# Patient Record
Sex: MALE | Race: WHITE | Employment: UNEMPLOYED | URBAN - METROPOLITAN AREA
[De-identification: names, ages, dates, MRNs, and addresses within clinical notes are randomized per-mention and may not be internally consistent; named-entity substitution may affect disease eponyms.]

---

## 2017-01-03 ENCOUNTER — OFFICE VISIT (OUTPATIENT)
Dept: FAMILY MEDICINE | Facility: CLINIC | Age: 42
End: 2017-01-03
Payer: COMMERCIAL

## 2017-01-03 VITALS
OXYGEN SATURATION: 97 % | WEIGHT: 216.6 LBS | DIASTOLIC BLOOD PRESSURE: 82 MMHG | BODY MASS INDEX: 31.08 KG/M2 | HEART RATE: 82 BPM | TEMPERATURE: 97.9 F | SYSTOLIC BLOOD PRESSURE: 122 MMHG

## 2017-01-03 DIAGNOSIS — G47.00 INSOMNIA, UNSPECIFIED TYPE: ICD-10-CM

## 2017-01-03 DIAGNOSIS — R07.0 THROAT PAIN: Primary | ICD-10-CM

## 2017-01-03 LAB
DEPRECATED S PYO AG THROAT QL EIA: NORMAL
MICRO REPORT STATUS: NORMAL
SPECIMEN SOURCE: NORMAL

## 2017-01-03 PROCEDURE — 87081 CULTURE SCREEN ONLY: CPT | Performed by: PHYSICIAN ASSISTANT

## 2017-01-03 PROCEDURE — 87880 STREP A ASSAY W/OPTIC: CPT | Performed by: PHYSICIAN ASSISTANT

## 2017-01-03 PROCEDURE — 99213 OFFICE O/P EST LOW 20 MIN: CPT | Performed by: PHYSICIAN ASSISTANT

## 2017-01-03 RX ORDER — TRAZODONE HYDROCHLORIDE 150 MG/1
150 TABLET ORAL AT BEDTIME
Qty: 30 TABLET | Refills: 0 | Status: SHIPPED | OUTPATIENT
Start: 2017-01-03 | End: 2017-03-24

## 2017-01-03 NOTE — NURSING NOTE
"Chief Complaint   Patient presents with     Pharyngitis       Initial /82 mmHg  Pulse 82  Temp(Src) 97.9  F (36.6  C) (Oral)  Wt 216 lb 9.6 oz (98.249 kg)  SpO2 97% Estimated body mass index is 31.08 kg/(m^2) as calculated from the following:    Height as of 2/18/16: 5' 10\" (1.778 m).    Weight as of this encounter: 216 lb 9.6 oz (98.249 kg).  BP completed using cuff size: cici VEGAS CMA ([facility])  3:26 PM 1/3/2017      "
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
## 2017-01-03 NOTE — Clinical Note
[facility]  [address] [facility] [address]  Phone: [phone]    January 3, 2017        Asher Gonzales  [address]  [facility] [address]          To whom it may concern:    RE: Asher Gonzales    Patient was seen and treated today at our clinic and missed work.    Please contact me for questions or concerns.      Sincerely,        Carole Rosenbaum PA-C

## 2017-01-05 LAB
BACTERIA SPEC CULT: NORMAL
MICRO REPORT STATUS: NORMAL
SPECIMEN SOURCE: NORMAL

## 2017-01-09 ENCOUNTER — OFFICE VISIT (OUTPATIENT)
Dept: FAMILY MEDICINE | Facility: CLINIC | Age: 42
End: 2017-01-09
Payer: COMMERCIAL

## 2017-01-09 VITALS
RESPIRATION RATE: 15 BRPM | HEART RATE: 97 BPM | TEMPERATURE: 97.7 F | DIASTOLIC BLOOD PRESSURE: 77 MMHG | WEIGHT: 213 LBS | SYSTOLIC BLOOD PRESSURE: 122 MMHG | BODY MASS INDEX: 30.56 KG/M2 | OXYGEN SATURATION: 95 %

## 2017-01-09 DIAGNOSIS — J20.9 ACUTE BRONCHITIS WITH SYMPTOMS > 10 DAYS: ICD-10-CM

## 2017-01-09 DIAGNOSIS — J01.90 ACUTE SINUSITIS WITH SYMPTOMS > 10 DAYS: Primary | ICD-10-CM

## 2017-01-09 DIAGNOSIS — R06.2 WHEEZING: ICD-10-CM

## 2017-01-09 PROCEDURE — 99213 OFFICE O/P EST LOW 20 MIN: CPT | Performed by: PHYSICIAN ASSISTANT

## 2017-01-09 RX ORDER — ALBUTEROL SULFATE 90 UG/1
2 AEROSOL, METERED RESPIRATORY (INHALATION) EVERY 4 HOURS PRN
Qty: 1 INHALER | Refills: 0 | Status: SHIPPED | OUTPATIENT
Start: 2017-01-09 | End: 2017-07-11

## 2017-01-09 RX ORDER — AZITHROMYCIN 250 MG/1
TABLET, FILM COATED ORAL
Qty: 6 TABLET | Refills: 0 | Status: SHIPPED | OUTPATIENT
Start: 2017-01-09 | End: 2017-03-24

## 2017-01-09 ASSESSMENT — PAIN SCALES - GENERAL: PAINLEVEL: EXTREME PAIN (8)
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
## 2017-01-09 NOTE — Clinical Note
[facility]  [address] [facility] [address]  Phone: [phone]    January 9, 2017        Asher Gonzales  [address]  [facility] [address]          To whom it may concern:    RE: Asher Gonzales    Patient was seen and treated today at our clinic. Please excuse from work 1/10/2017. May return 1/11/2017.    Please contact me for questions or concerns.      Sincerely,        Carole Rosenbaum PA-C

## 2017-01-09 NOTE — MR AVS SNAPSHOT
After Visit Summary   1/9/2017    Asher Gonzales    MRN: [id number]           Patient Information     Date Of Birth          1975        Visit Information        Provider Department      1/9/2017 3:00 PM Carole Rosenbaum PA-C [facility]        Today's Diagnoses     Acute sinusitis with symptoms > 10 days    -  1     Acute bronchitis with symptoms > 10 days         Wheezing            Follow-ups after your visit        Who to contact     If you have questions or need follow up information about today's clinic visit or your schedule please contact [facility] directly at [phone].  Normal or non-critical lab and imaging results will be communicated to you by Storitzhart, letter or phone within 4 business days after the clinic has received the results. If you do not hear from us within 7 days, please contact the clinic through [employer] Mobilet or phone. If you have a critical or abnormal lab result, we will notify you by phone as soon as possible.  Submit refill requests through Contextool or call your pharmacy and they will forward the refill request to us. Please allow 3 business days for your refill to be completed.          Additional Information About Your Visit        MyChart Information     [employer]ool gives you secure access to your electronic health record. If you see a primary care provider, you can also send messages to your care team and make appointments. If you have questions, please call your primary care clinic.  If you do not have a primary care provider, please call [phone] and they will assist you.        Care EveryWhere ID     This is your Care EveryWhere ID. This could be used by other organizations to access your [place] medical records  [id number]        Your Vitals Were     Pulse Temperature Respirations Pulse Oximetry          97 97.7  F (36.5  C) (Oral) 15 95%         Blood Pressure from Last 3 Encounters:   01/09/17 122/77   01/03/17 122/82   11/07/16  127/82    Weight from Last 3 Encounters:   01/09/17 213 lb (96.616 kg)   01/03/17 216 lb 9.6 oz (98.249 kg)   11/07/16 203 lb 9.6 oz (92.352 kg)              Today, you had the following     No orders found for display         Today's Medication Changes          These changes are accurate as of: 1/9/17  3:15 PM.  If you have any questions, ask your nurse or doctor.               Start taking these medicines.        Dose/Directions    albuterol 108 (90 BASE) MCG/ACT Inhaler   Commonly known as:  PROAIR HFA/PROVENTIL HFA/VENTOLIN HFA   Used for:  Wheezing   Started by:  Carole Rosenbaum PA-C        Dose:  2 puff   Inhale 2 puffs into the lungs every 4 hours as needed for shortness of breath / dyspnea or wheezing   Quantity:  1 Inhaler   Refills:  0       azithromycin 250 MG tablet   Commonly known as:  ZITHROMAX Z-CHAITANYA   Used for:  Acute sinusitis with symptoms > 10 days, Acute bronchitis with symptoms > 10 days   Started by:  Carole Rosenbaum PA-C        2 tabs day one then 1 tab qd   Quantity:  6 tablet   Refills:  0            Where to get your medicines      These medications were sent to [address]  [id number] [place] [address]     Phone:  [phone]    - albuterol 108 (90 BASE) MCG/ACT Inhaler  - azithromycin 250 MG tablet             Primary Care Provider Office Phone # Fax #    Angus Astorga [phone]       [address] [address]        Thank you!     Thank you for choosing [facility]  for your care. Our goal is always to provide you with excellent care. Hearing back from our patients is one way we can continue to improve our services. Please take a few minutes to complete the written survey that you may receive in the mail after your visit with us. Thank you!             Your Updated Medication List - Protect others around you: Learn how to safely use,  store and throw away your medicines at www.disposemymeds.org.          This list is accurate as of: 1/9/17  3:15 PM.  Always use your most recent med list.                   Brand Name Dispense Instructions for use    albuterol 108 (90 BASE) MCG/ACT Inhaler    PROAIR HFA/PROVENTIL HFA/VENTOLIN HFA    1 Inhaler    Inhale 2 puffs into the lungs every 4 hours as needed for shortness of breath / dyspnea or wheezing       azithromycin 250 MG tablet    ZITHROMAX Z-CHAITANYA    6 tablet    2 tabs day one then 1 tab qd       * mirtazapine 30 MG tablet    REMERON         * mirtazapine 30 MG tablet    REMERON    30 tablet    Take 1 tablet (30 mg) by mouth At Bedtime       traZODone 150 MG tablet    DESYREL    30 tablet    Take 1 tablet (150 mg) by mouth At Bedtime       * Notice:  This list has 2 medication(s) that are the same as other medications prescribed for you. Read the directions carefully, and ask your doctor or other care provider to review them with you.

## 2017-01-09 NOTE — NURSING NOTE
"Chief Complaint   Patient presents with     Cough     c/o cough and sore throat, see 1/3/17 and tested negative for strep       Initial /77 mmHg  Pulse 97  Temp(Src) 97.7  F (36.5  C) (Oral)  Resp 15  Wt 213 lb (96.616 kg)  SpO2 95% Estimated body mass index is 30.56 kg/(m^2) as calculated from the following:    Height as of 2/18/16: 5' 10\" (1.778 m).    Weight as of this encounter: 213 lb (96.616 kg).  BP completed using cuff size: christen Torres MA      "

## 2017-02-11 LAB — PHQ9 SCORE: 20

## 2017-03-09 ENCOUNTER — TRANSFERRED RECORDS (OUTPATIENT)
Dept: HEALTH INFORMATION MANAGEMENT | Facility: CLINIC | Age: 42
End: 2017-03-09

## 2017-03-20 ENCOUNTER — CARE COORDINATION (OUTPATIENT)
Dept: CARE COORDINATION | Facility: CLINIC | Age: 42
End: 2017-03-20

## 2017-03-20 ENCOUNTER — TELEPHONE (OUTPATIENT)
Dept: CARE COORDINATION | Facility: CLINIC | Age: 42
End: 2017-03-20

## 2017-03-20 DIAGNOSIS — I60.9 SUBARACHNOID HEMORRHAGE (H): Primary | ICD-10-CM

## 2017-03-20 NOTE — TELEPHONE ENCOUNTER
DC'd from [facility] on 3/18 to Home  Primary Problem: Subarachnoid hemorrhage  LOS: 1.7  Readmit Risk: High
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
## 2017-03-22 ENCOUNTER — CARE COORDINATION (OUTPATIENT)
Dept: CARE COORDINATION | Facility: CLINIC | Age: 42
End: 2017-03-22

## 2017-03-22 NOTE — PROGRESS NOTES
Clinic Care Coordination Contact  OUTREACH    Referral Information:  Referral Source: IP/TCU Report  Reason for Contact: Post Hospital Follow Up        Universal Utilization:   ED Visits in last year: 1  Hospital visits in last year: 1       Clinical Concerns:  Current Medical Concerns:   DC'd from [facility] on 3/18 to Home  Primary Problem: Subarachnoid hemorrhage  LOS: 1.7  Readmit Risk: High     Current Behavioral Concerns: Epic reviewed. [facility] visit note from 3/9/17. It indicated that patient had been drinking alcohol that day. Indicates that they will not accept him there and needs to enroll in CD program. Writer discussed case with Jad FERRER here at [facility]. She agreed to stop in to see him in clinic when here 3/24 for post hospital follow up visit. This was included in my voicemail to him today.    24/2017 11:00 AM Roseann Dickson PA-C [facility]          Outreach attempted x 1.  Left message on voicemail with call back information and requested return call.  Plan: Care Coordinator will touch base with Jad after see meets with patient. Will discuss plan going forward from there    Toby Bhatia RN  Clinic Care Coordinator  [facility]  [phone]

## 2017-03-24 ENCOUNTER — OFFICE VISIT (OUTPATIENT)
Dept: BEHAVIORAL HEALTH | Facility: CLINIC | Age: 42
End: 2017-03-24
Payer: COMMERCIAL

## 2017-03-24 ENCOUNTER — OFFICE VISIT (OUTPATIENT)
Dept: FAMILY MEDICINE | Facility: CLINIC | Age: 42
End: 2017-03-24

## 2017-03-24 ENCOUNTER — CARE COORDINATION (OUTPATIENT)
Dept: CARE COORDINATION | Facility: CLINIC | Age: 42
End: 2017-03-24

## 2017-03-24 VITALS
BODY MASS INDEX: 29.82 KG/M2 | TEMPERATURE: 99.1 F | HEART RATE: 107 BPM | HEIGHT: 71 IN | WEIGHT: 213 LBS | DIASTOLIC BLOOD PRESSURE: 84 MMHG | SYSTOLIC BLOOD PRESSURE: 118 MMHG | OXYGEN SATURATION: 97 %

## 2017-03-24 DIAGNOSIS — K13.0 LIP PAIN: ICD-10-CM

## 2017-03-24 DIAGNOSIS — Z86.69 H/O TONIC-CLONIC SEIZURES: ICD-10-CM

## 2017-03-24 DIAGNOSIS — F51.01 PRIMARY INSOMNIA: ICD-10-CM

## 2017-03-24 DIAGNOSIS — G47.00 INSOMNIA, UNSPECIFIED TYPE: ICD-10-CM

## 2017-03-24 DIAGNOSIS — F10.20 ALCOHOLISM (H): Primary | ICD-10-CM

## 2017-03-24 DIAGNOSIS — F33.9 EPISODE OF RECURRENT MAJOR DEPRESSIVE DISORDER, UNSPECIFIED DEPRESSION EPISODE SEVERITY (H): ICD-10-CM

## 2017-03-24 DIAGNOSIS — R69 DIAGNOSIS DEFERRED: Primary | ICD-10-CM

## 2017-03-24 PROCEDURE — 99207 ZZC NO CHARGE LOS: CPT

## 2017-03-24 RX ORDER — TRAZODONE HYDROCHLORIDE 150 MG/1
150 TABLET ORAL AT BEDTIME
Qty: 30 TABLET | Refills: 1 | Status: SHIPPED | OUTPATIENT
Start: 2017-03-24 | End: 2017-05-23

## 2017-03-24 RX ORDER — CARBAMAZEPINE 200 MG/1
200 TABLET ORAL
COMMUNITY
Start: 2017-03-18 | End: 2018-09-17

## 2017-03-24 RX ORDER — OXYCODONE AND ACETAMINOPHEN 5; 325 MG/1; MG/1
1 TABLET ORAL EVERY 8 HOURS PRN
Qty: 10 TABLET | Refills: 0 | Status: SHIPPED | OUTPATIENT
Start: 2017-03-24 | End: 2017-05-23

## 2017-03-24 RX ORDER — MIRTAZAPINE 30 MG/1
30 TABLET, FILM COATED ORAL AT BEDTIME
Qty: 30 TABLET | Refills: 1 | Status: SHIPPED | OUTPATIENT
Start: 2017-03-24 | End: 2017-05-23

## 2017-03-24 RX ORDER — OXYCODONE AND ACETAMINOPHEN 5; 325 MG/1; MG/1
1 TABLET ORAL
COMMUNITY
Start: 2017-03-18 | End: 2017-03-24

## 2017-03-24 ASSESSMENT — ANXIETY QUESTIONNAIRES
1. FEELING NERVOUS, ANXIOUS, OR ON EDGE: SEVERAL DAYS
4. TROUBLE RELAXING: MORE THAN HALF THE DAYS
7. FEELING AFRAID AS IF SOMETHING AWFUL MIGHT HAPPEN: NOT AT ALL
6. BECOMING EASILY ANNOYED OR IRRITABLE: MORE THAN HALF THE DAYS
5. BEING SO RESTLESS THAT IT IS HARD TO SIT STILL: NOT AT ALL
2. NOT BEING ABLE TO STOP OR CONTROL WORRYING: MORE THAN HALF THE DAYS
GAD7 TOTAL SCORE: 10
3. WORRYING TOO MUCH ABOUT DIFFERENT THINGS: NEARLY EVERY DAY

## 2017-03-24 NOTE — PROGRESS NOTES
Clinic Care Coordination Contact Attempt #2  OUTREACH     Referral Information:  Referral Source: IP/TCU Report  Reason for Contact: Post Hospital Follow Up         Universal Utilization:   ED Visits in last year: 1  Hospital visits in last year: 1         Clinical Concerns:  Current Medical Concerns:   DC'd from [facility] on 3/18 to Home  Primary Problem: Subarachnoid hemorrhage  LOS: 1.7  Readmit Risk: High       Epic reviewed. Had post hospital follow visit with Roseann Dickson PA-C today. Reviewed need to follow up with neurology for seizures-is in Tegretol 200 mg bid for 2 weeks and is to then take 400 mg bid on-going Patient spoke with Jad FERRER here at [facility]. No discussions regarding CD treatment. He did agree to sign up for [facility] services. Care Everywhere [facility] chart review:    1. Is to f/u with Dr [facility] in 2-3 weeks.  2. Dr Baugh? In 3-4 weeks  3.Referrals for PT-balance and OT-cognition were placed to Cynthia Argueta     24/2017 11:00 AM Roseann Dickson PA-C [facility]             Outreach attempted x 1. Left message on voicemail with call back information and requested return call.Sent [facility] letter    Plan: Care Coordinator will touch base with patient in 2-5 business days.     Toby Bhatia RN  Clinic Care Coordinator  [facility]  [phone]

## 2017-03-24 NOTE — PATIENT INSTRUCTIONS
Make sure to follow up with neurology for your seizures, this is important for long-term follow up of your seizures.    Never mix alcohol with percocet.  Because of your history of seizures it is best to completely avoid alcohol.    Wean off percocet, this can be addicting and therefore is used as short term as possible  Let me know if your tongue does not continue to improve

## 2017-03-24 NOTE — MR AVS SNAPSHOT
After Visit Summary   3/24/2017    Asher Gonzales    MRN: [id number]           Patient Information     Date Of Birth          1975        Visit Information        Provider Department      3/24/2017 11:30 AM Jad Nicholas [facility]        Today's Diagnoses     Diagnosis deferred    -  1       Follow-ups after your visit        Who to contact     If you have questions or need follow up information about today's clinic visit or your schedule please contact [facility] directly at [phone].  Normal or non-critical lab and imaging results will be communicated to you by MyChart, letter or phone within 4 business days after the clinic has received the results. If you do not hear from us within 7 days, please contact the clinic through [email or web address] or phone. If you have a critical or abnormal lab result, we will notify you by phone as soon as possible.  Submit refill requests through [employer] or call your pharmacy and they will forward the refill request to us. Please allow 3 business days for your refill to be completed.          Additional Information About Your Visit        MyChart Information     [employer] gives you secure access to your electronic health record. If you see a primary care provider, you can also send messages to your care team and make appointments. If you have questions, please call your primary care clinic.  If you do not have a primary care provider, please call [phone] and they will assist you.        Care EveryWhere ID     This is your Care EveryWhere ID. This could be used by other organizations to access your [place] medical records  [id number]         Blood Pressure from Last 3 Encounters:   03/24/17 118/84   01/09/17 122/77   01/03/17 122/82    Weight from Last 3 Encounters:   03/24/17 96.6 kg (213 lb)   01/09/17 96.6 kg (213 lb)   01/03/17 98.2 kg (216 lb 9.6 oz)              Today, you had the following     No orders found for display          Today's Medication Changes          These changes are accurate as of: 3/24/17 12:50 PM.  If you have any questions, ask your nurse or doctor.               These medicines have changed or have updated prescriptions.        Dose/Directions    oxyCODONE-acetaminophen 5-325 MG per tablet   Commonly known as:  PERCOCET   This may have changed:    - when to take this  - reasons to take this  - additional instructions   Used for:  Lip pain   Changed by:  Roseann Dickson PA-C        Dose:  1 tablet   Take 1 tablet by mouth every 8 hours as needed for moderate to severe pain No refills. Try to wean down on use.   Quantity:  10 tablet   Refills:  0            Where to get your medicines      These medications were sent to [facility] [address] [facility] [address]  [address] [address] [address]     Phone:  [phone]     mirtazapine 30 MG tablet    traZODone 150 MG tablet         Some of these will need a paper prescription and others can be bought over the counter.  Ask your nurse if you have questions.     Bring a paper prescription for each of these medications     oxyCODONE-acetaminophen 5-325 MG per tablet                Primary Care Provider Office Phone # Fax #    Angus Astorga -[phone]       [address] [address]        Thank you!     Thank you for choosing [facility]  for your care. Our goal is always to provide you with excellent care. Hearing back from our patients is one way we can continue to improve our services. Please take a few minutes to complete the written survey that you may receive in the mail after your visit with us. Thank you!             Your Updated Medication List - Protect others around you: Learn how to safely use, store and throw away your medicines at www.disposemymeds.org.          This list is accurate as of: 3/24/17 12:50 PM.  Always use your most recent  med list.                   Brand Name Dispense Instructions for use    albuterol 108 (90 BASE) MCG/ACT Inhaler    PROAIR HFA/PROVENTIL HFA/VENTOLIN HFA    1 Inhaler    Inhale 2 puffs into the lungs every 4 hours as needed for shortness of breath / dyspnea or wheezing       carBAMazepine 200 MG tablet    TEGretol     Take 200 mg by mouth       mirtazapine 30 MG tablet    REMERON    30 tablet    Take 1 tablet (30 mg) by mouth At Bedtime       oxyCODONE-acetaminophen 5-325 MG per tablet    PERCOCET    10 tablet    Take 1 tablet by mouth every 8 hours as needed for moderate to severe pain No refills. Try to wean down on use.       traZODone 150 MG tablet    DESYREL    30 tablet    Take 1 tablet (150 mg) by mouth At Bedtime

## 2017-03-24 NOTE — LETTER
[place] [facility] CARE COORDINATION  [address] [facility] [address]  Phone: [phone]      March 24, 2017      Asher Gonzales  [address]  [place] [address]    Dear Asher,    We have been trying to reach you to introduce you to [facility] s Care Coordination program.  The goal of care coordination is to help you manage your health and improve access to the [facility] system in the most efficient manner.  The Care Coordinator is a nurse who understands the healthcare system and will assist you in improving your access to care.     As your Physician and Care Coordinator we partner to help you achieve your health care goals.     We will continue to reach out; however, if you are able to call your Care Coordinator at [phone], that would be appreciated.  We at [facility] are focused on providing you with the highest-quality healthcare experience possible.      It is a pleasure to partner with you as we work towards achieving your optimal state of wellness.        Sincerely,        Angus Moscoso  None   [facility] [address] [address]  / [facility] 5*

## 2017-03-24 NOTE — MR AVS SNAPSHOT
After Visit Summary   3/24/2017    Asher Gonzales    MRN: [id number]           Patient Information     Date Of Birth          1975        Visit Information        Provider Department      3/24/2017 11:00 AM Roseann Dickson PA-C [facility]        Today's Diagnoses     Alcoholism (H)    -  1    H/O tonic-clonic seizures        Insomnia, unspecified type        Episode of recurrent major depressive disorder, unspecified depression episode severity (H)        Primary insomnia        Lip pain          Care Instructions    Make sure to follow up with neurology for your seizures, this is important for long-term follow up of your seizures.    Never mix alcohol with percocet.  Because of your history of seizures it is best to completely avoid alcohol.    Wean off percocet, this can be addicting and therefore is used as short term as possible  Let me know if your tongue does not continue to improve            Follow-ups after your visit        Who to contact     If you have questions or need follow up information about today's clinic visit or your schedule please contact [facility] directly at [phone].  Normal or non-critical lab and imaging results will be communicated to you by [email or web address], letter or phone within 4 business days after the clinic has received the results. If you do not hear from us within 7 days, please contact the clinic through [employer] or phone. If you have a critical or abnormal lab result, we will notify you by phone as soon as possible.  Submit refill requests through [employer] or call your pharmacy and they will forward the refill request to us. Please allow 3 business days for your refill to be completed.          Additional Information About Your Visit        [email or web address] Information     [employer] gives you secure access to your electronic health record. If you see a primary care provider, you can also send messages to your care team and make  "appointments. If you have questions, please call your primary care clinic.  If you do not have a primary care provider, please call [phone] and they will assist you.        Care EveryWhere ID     This is your Care EveryWhere ID. This could be used by other organizations to access your [place] medical records  [id number]        Your Vitals Were     Pulse Temperature Height Pulse Oximetry BMI (Body Mass Index)       107 99.1  F (37.3  C) (Oral) 5' 11\" (1.803 m) 97% 29.71 kg/m2        Blood Pressure from Last 3 Encounters:   03/24/17 118/84   01/09/17 122/77   01/03/17 122/82    Weight from Last 3 Encounters:   03/24/17 213 lb (96.6 kg)   01/09/17 213 lb (96.6 kg)   01/03/17 216 lb 9.6 oz (98.2 kg)              Today, you had the following     No orders found for display         Today's Medication Changes          These changes are accurate as of: 3/24/17 11:22 AM.  If you have any questions, ask your nurse or doctor.               These medicines have changed or have updated prescriptions.        Dose/Directions    oxyCODONE-acetaminophen 5-325 MG per tablet   Commonly known as:  PERCOCET   This may have changed:    - when to take this  - reasons to take this  - additional instructions   Used for:  Lip pain   Changed by:  Roseann Dickson PA-C        Dose:  1 tablet   Take 1 tablet by mouth every 8 hours as needed for moderate to severe pain No refills. Try to wean down on use.   Quantity:  10 tablet   Refills:  0            Where to get your medicines      These medications were sent to [facility] [address] [facility]  [address] [facility] [facility] [address], [facility] [address]     Phone:  [phone]     mirtazapine 30 MG tablet    traZODone 150 MG tablet         Some of these will need a paper prescription and others can be bought over the counter.  Ask your nurse if you have questions.     Bring a paper prescription for each of these medications     " oxyCODONE-acetaminophen 5-325 MG per tablet                Primary Care Provider Office Phone # Fax #    Angus Astorga [phone]       [facility] [address] [address] [facility] [address]        Thank you!     Thank you for choosing [facility]  for your care. Our goal is always to provide you with excellent care. Hearing back from our patients is one way we can continue to improve our services. Please take a few minutes to complete the written survey that you may receive in the mail after your visit with us. Thank you!             Your Updated Medication List - Protect others around you: Learn how to safely use, store and throw away your medicines at www.disposemymeds.org.          This list is accurate as of: 3/24/17 11:22 AM.  Always use your most recent med list.                   Brand Name Dispense Instructions for use    albuterol 108 (90 BASE) MCG/ACT Inhaler    PROAIR HFA/PROVENTIL HFA/VENTOLIN HFA    1 Inhaler    Inhale 2 puffs into the lungs every 4 hours as needed for shortness of breath / dyspnea or wheezing       carBAMazepine 200 MG tablet    TEGretol     Take 200 mg by mouth       mirtazapine 30 MG tablet    REMERON    30 tablet    Take 1 tablet (30 mg) by mouth At Bedtime       oxyCODONE-acetaminophen 5-325 MG per tablet    PERCOCET    10 tablet    Take 1 tablet by mouth every 8 hours as needed for moderate to severe pain No refills. Try to wean down on use.       traZODone 150 MG tablet    DESYREL    30 tablet    Take 1 tablet (150 mg) by mouth At Bedtime

## 2017-03-24 NOTE — NURSING NOTE
"Chief Complaint   Patient presents with     Hospital F/U     [facility] F/U for seizure       Initial /90  Pulse 107  Temp 99.1  F (37.3  C) (Oral)  Ht 5' 11\" (1.803 m)  Wt 213 lb (96.6 kg)  SpO2 97%  BMI 29.71 kg/m2 Estimated body mass index is 29.71 kg/(m^2) as calculated from the following:    Height as of this encounter: 5' 11\" (1.803 m).    Weight as of this encounter: 213 lb (96.6 kg).  Medication Reconciliation: complete      Dayne Oscar MA'    "

## 2017-03-24 NOTE — PROGRESS NOTES
Behavioral Health Home Services   [facility] Clinic: [place]    Social Work Care Navigator Note    Patient: Asher Gonzales  Date: March 24, 2017  Preferred Name: Asher    Previous PHQ-9:   PHQ-9 SCORE 1/27/2012 2/17/2012 10/9/2012   Total Score 9 8 0     Previous FRANKLIN-7: No flowsheet data found.  MAVERICK LEVEL:  MAVERICK Score (Last Two) 1/27/2012   MAVERICK Raw Score 37   Activation Score 49.9   MAVERICK Level 2       Preferred Contact:    Type of Contact: Face to Face in Clinic    Data: (subjective / Objective):    [facility] Introduction:  Hi my name is Jad Nicholas from your [place] primary care clinic.     I work closely with your primary care provider, Angus Astorga.     If it's ok I'd like to talk about some new services available to you, at no out of pocket cost to you.      Before we get started can you verify your insurance for me? [employer]    What social work or case management services do you receive? (If so, are you receiving ACT or TCM?) None     Getting to Know You - Whole Person Care:  This new service is called Behavioral Health Home services, which is designed to support you as a whole person beyond just your medical needs.      Tell me about what types of things that are causing you stress OR impacting your quality of life?    Unemployment    Health    Not being able to see his kids    I'm here to be a central point of contact for your healthcare needs and to help with:    Housing    Transportation    Financial resources    Comprehensive Health needs (appointment help, medication costs, etc.)    Employment    Education    Health Insurance applications    And connecting with social supports or community resources    Out of the things I mentioned what would you find helpful?    Writer met with pt to discuss [facility] service. Pt is interested in service and signed enrollment paperwork. Pt would like help with employment and having regular check in with [facility] in regards to overall health and his changes that occur in his life. Pt  "would like a call from RN Care Coordinator in regards to his health and hospital follow up. Pt signed MELISSA for therapist at [facility] [employer] in order to get DA.       Pt reports he gets SNAP benefits but is unable to get cash assistance due to living with his father and father having high income. Pt does utilize food shelf when necessary.       Writer will call pt when the DA is reviewed to set up comprehensive wellness assessment with [facility]. Writer welcomed phone calls from patient should he have any questions/concerns in the meantime.       We'll work together on a brief assessment to better understand how we can help and then put together a plan to meet your needs.    Patient response to [facility] Service offering:   Interested in enrolling in [facility] services and scheduled appt / will drop-in to complete the consent form and Brief Needs Assessment    Jad Nicholas, Social Work Care Coordinator   ________________________________________________________________  Administrative - Social Work Staff Info:     Update the [facility] Brief Needs Assessment Flowsheet \"enrollment status\"     \"declined\"    \"considering\" enrolling in [facility] services.      \"Interested and will enroll\"    \"waitlisted\"    Update enrollment status to \"enrolled\" only when they have come into clinic and completed the paper consent and brief needs assessment.    Verify that patient has had Diagnostic Assessment within the past 12 months and if not schedule an appointment with the [facility] to complete.              "

## 2017-03-25 ASSESSMENT — PATIENT HEALTH QUESTIONNAIRE - PHQ9: SUM OF ALL RESPONSES TO PHQ QUESTIONS 1-9: 13

## 2017-03-25 ASSESSMENT — ANXIETY QUESTIONNAIRES: GAD7 TOTAL SCORE: 10

## 2017-03-28 ENCOUNTER — CARE COORDINATION (OUTPATIENT)
Dept: CARE COORDINATION | Facility: CLINIC | Age: 42
End: 2017-03-28

## 2017-03-28 ENCOUNTER — TELEPHONE (OUTPATIENT)
Dept: BEHAVIORAL HEALTH | Facility: CLINIC | Age: 42
End: 2017-03-28

## 2017-03-28 NOTE — TELEPHONE ENCOUNTER
Behavioral Health Home Services  @FLOW([id number])@    Social Work Care Navigator Note    Patient: Asher Gonzales  Date: March 28, 2017  Preferred Name: Asher    Previous PHQ-9:   PHQ-9 SCORE 2/17/2012 10/9/2012 3/24/2017   Total Score 8 0 -   Total Score - - 13     Previous FRANKLIN-7:   FRANKLIN-7 SCORE 3/24/2017   Total Score 10     MAVERICK LEVEL:  MAVERICK Score (Last Two) 1/27/2012 3/24/2017   MAVERICK Raw Score 37 36   Activation Score 49.9 75.5   MAVERICK Level 2 4       Preferred Contact: @FLOW([id number])@  Type of Contact: Phone call (patient reached)    Data: (subjective / Objective):  Patient Goals Areas: @FLOW([id number])@  Patient Stated Goals: @FLOW([id number])@  Recent ED/IP Admission or Discharge?   [facility] ED Visit date: 03/16/2017    Objectives Addressed Today:    Patient called writer today to check on status on [facility]. Writer told pt that [facility] has to review DA first and that writer would call patient to schedule Wellness Assessment. Pt inquired about RN Care Coordinator follow up. Patient stated he would call RN Coordinator after conversation.       Writer to follow up with patient for wellness assessment.      Jad Nicholas, Social Work Care Coordinator

## 2017-03-28 NOTE — PROGRESS NOTES
Clinic Care Coordination Contact    OUTREACH      Referral Information:  Referral Source: IP/TCU Report  Reason for Contact: Post Hospital Follow Up. Warm transfer from Jad FERRER who is working with patient on chem dep issue and financial concerns.          Universal Utilization:   ED Visits in last year: 1  Hospital visits in last year: 1          Clinical Concerns:  Current Medical Concerns:   [facility] from [facility] Home  Primary Problem: Subarachnoid hemorrhage  LOS: 1.7  Readmit Risk: High        Had post hospital follow visit with Roseann Dickson PA-C . Reviewed need to follow up with neurology for seizures-is onTegretol 200 mg bid for 2 weeks and is to then take 400 mg bid on-going. Continues to have rib and sternal discomfort r/t resuscitation during hospitalization.      1. Is to f/u with Dr [facility] in 2-3 weeks. Discussed scheduling this week of 4/10. Made this a goal for him to schedule this. Per patient He will increase Tegretol to 400 mg bid 4/1  2. Dr Baugh-Neurosurgeon. It was decided during hospitalization that no surgical intervention needed. No follow up needed  3.Referrals for PT-balance and OT-cognition were placed to Cynthia Argueta. Patient does not want to pursue this. At present he wants to focus on neurology follow ups.      Plan: As above. Patient agreed to call me for any further needs. I will outreach him in 2 weeks.    Toby Bhatia RN  Clinic Care Coordinator  [facility]  [phone]

## 2017-03-29 ENCOUNTER — DOCUMENTATION ONLY (OUTPATIENT)
Dept: BEHAVIORAL HEALTH | Facility: CLINIC | Age: 42
End: 2017-03-29

## 2017-03-29 NOTE — PROGRESS NOTES
[facility] Chart Review and Problem List Reconciliation   Completed review of DA from [employer] dated 3/9/17 for [facility] Program enrollment   Documents scanned into Epic.

## 2017-04-05 ENCOUNTER — OFFICE VISIT (OUTPATIENT)
Dept: BEHAVIORAL HEALTH | Facility: CLINIC | Age: 42
End: 2017-04-05
Payer: COMMERCIAL

## 2017-04-05 DIAGNOSIS — R69 DIAGNOSIS DEFERRED: Primary | ICD-10-CM

## 2017-04-05 PROCEDURE — 99207 ZZC NO CHARGE LOS: CPT

## 2017-04-05 ASSESSMENT — ANXIETY QUESTIONNAIRES
4. TROUBLE RELAXING: SEVERAL DAYS
IF YOU CHECKED OFF ANY PROBLEMS ON THIS QUESTIONNAIRE, HOW DIFFICULT HAVE THESE PROBLEMS MADE IT FOR YOU TO DO YOUR WORK, TAKE CARE OF THINGS AT HOME, OR GET ALONG WITH OTHER PEOPLE: NOT DIFFICULT AT ALL
2. NOT BEING ABLE TO STOP OR CONTROL WORRYING: SEVERAL DAYS
7. FEELING AFRAID AS IF SOMETHING AWFUL MIGHT HAPPEN: NOT AT ALL
3. WORRYING TOO MUCH ABOUT DIFFERENT THINGS: SEVERAL DAYS
5. BEING SO RESTLESS THAT IT IS HARD TO SIT STILL: NOT AT ALL
6. BECOMING EASILY ANNOYED OR IRRITABLE: SEVERAL DAYS
1. FEELING NERVOUS, ANXIOUS, OR ON EDGE: SEVERAL DAYS
GAD7 TOTAL SCORE: 5

## 2017-04-05 NOTE — MR AVS SNAPSHOT
After Visit Summary   4/5/2017    Asher Gonzales    MRN: [id number]           Patient Information     Date Of Birth          1975        Visit Information        Provider Department      4/5/2017 11:00 AM Jad Nicholas [facility]        Today's Diagnoses     Diagnosis deferred    -  1       Follow-ups after your visit        Your next 10 appointments already scheduled     May 05, 2017 11:00 AM CDT   Return Visit with Jad Nicholas   [facility]    [address] [facility] [address] [phone]              Who to contact     If you have questions or need follow up information about today's clinic visit or your schedule please contact [facility] directly at [phone].  Normal or non-critical lab and imaging results will be communicated to you by MyChart, letter or phone within 4 business days after the clinic has received the results. If you do not hear from us within 7 days, please contact the clinic through MyChart or phone. If you have a critical or abnormal lab result, we will notify you by phone as soon as possible.  Submit refill requests through [employer] or call your pharmacy and they will forward the refill request to us. Please allow 3 business days for your refill to be completed.          Additional Information About Your Visit        MyChart Information     [employer] gives you secure access to your electronic health record. If you see a primary care provider, you can also send messages to your care team and make appointments. If you have questions, please call your primary care clinic.  If you do not have a primary care provider, please call [phone] and they will assist you.        Care EveryWhere ID     This is your Care EveryWhere ID. This could be used by other organizations to access your [place] medical records  [id number]         Blood Pressure from Last 3 Encounters:   03/24/17 118/84    01/09/17 122/77   01/03/17 122/82    Weight from Last 3 Encounters:   03/24/17 96.6 kg (213 lb)   01/09/17 96.6 kg (213 lb)   01/03/17 98.2 kg (216 lb 9.6 oz)              Today, you had the following     No orders found for display       Primary Care Provider Office Phone # Fax #    Angus Astorga [phone]       [facility] [address] [facility] [address]        Thank you!     Thank you for choosing [facility]  for your care. Our goal is always to provide you with excellent care. Hearing back from our patients is one way we can continue to improve our services. Please take a few minutes to complete the written survey that you may receive in the mail after your visit with us. Thank you!             Your Updated Medication List - Protect others around you: Learn how to safely use, store and throw away your medicines at www.disposemymeds.org.          This list is accurate as of: 4/5/17  3:55 PM.  Always use your most recent med list.                   Brand Name Dispense Instructions for use    albuterol 108 (90 BASE) MCG/ACT Inhaler    PROAIR HFA/PROVENTIL HFA/VENTOLIN HFA    1 Inhaler    Inhale 2 puffs into the lungs every 4 hours as needed for shortness of breath / dyspnea or wheezing       carBAMazepine 200 MG tablet    TEGretol     Take 200 mg by mouth       mirtazapine 30 MG tablet    REMERON    30 tablet    Take 1 tablet (30 mg) by mouth At Bedtime       oxyCODONE-acetaminophen 5-325 MG per tablet    PERCOCET    10 tablet    Take 1 tablet by mouth every 8 hours as needed for moderate to severe pain No refills. Try to wean down on use.       traZODone 150 MG tablet    DESYREL    30 tablet    Take 1 tablet (150 mg) by mouth At Bedtime

## 2017-04-05 NOTE — Clinical Note
Patient is enrolled in Behavioral Health Home services. Together we have created a care plan that patient intends to follow with the support of the team.     The initial goal areas we established include:  Health, Mental Health, Employment & Financial     PCP: I will notify you via your preferred communication method if there is something specific that patient needs in order for [facility] to be successful. Otherwise; feel free to gl ance at this care plan at your convience.     Please let me know if there are any changes OR additional goals that you would like to have added to Care Plan prior to patient receiving a copy.       Thank you!     Jad Nicholas, SUSANNE  [facility]

## 2017-04-05 NOTE — LETTER
Behavioral Health Home ([facility]): Health Action Plan  [facility]    Name: Asher Gonzales  Preferred Name: Asher  : 1975  MRN: [id number]    How to Contact me    Home Phone [phone]   Mobile [phone]     Ok to contact me by email?* No   *Signed & Scanned Consent form Required*   [email or web address]     Name and contact of hooker supports: (Yun (mother) [phone]) ; (Jeb (father) [phone]) ; (Joan (friend) No Phone # Provided)     My Goals  Goal Areas: Health, Mental Health, Employment / Volunteer, Financial and Social Service Benefits    Patient stated goals:     Health:Goal is to follow up with neurology and their medical recommendations.       Mental Health: Goal is to continue with therapy appointments, follow their recommendations and interventions provided. Pt also lists goal as wanting to get some medication for depression & anxiety.       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       Financial: Goal is to potentially receive some benefits from the state such as SNAP & Cash Assistance.     Strengths related to each goal: Pt is timely, comes to appointments, determined to take care of his health, honest and genuine.      Services and Supports Needed: Pt reports needing the support of the care team for check in and to make sure he is on the right track of achieving his goals. Pt needs support of care team to contact county and figure out his benefits including food stamps.     Activities / Actions of Team to support goal(s): Team will provide support & services/ tools in order for patient to achieve his stated goals. Team will check in with patient on regular basis through monthly contact and in person visits. Team will encourage patient and provide support through communication and team work with patient's care team in order for patient to work towards the accomplishment of his goals. Team  will contact [facility] Economic Assistance for pt to figue out his benefits.     Activities / Actions of Patient / Parent / Guardian to support goal(s):     Health: Pt will attend his follow up Neurology appt scheduled for 4/11/2017 and continue to follow any recommendations.       Mental Health: Pt will continue to attend this therapy appointments & follow through with any recommendations & interventions provided.       Employment: Pt will follow up on job resources provided by care team and complete applications / attend interviews etc.       Financial: Pt will follow up on any additional information provided by [facility] for his benefits of [employer] [place] or any other programs he is eligible for. Patient will follow up with care team at clinic for anything else he may need.         Recommended Referral  Tobacco cessation referrals made?: No  Mental Health / Chemical Dependency Referrals: No  Mental Health Referrals: None     My Team Members and Their Contact Information  Patient Care Team       Relationship Specialty Notifications Start End    Angus Astorga MD PCP - General Family Practice  2/22/12     Phone: [phone] Fax: [phone]         [facility] [address] [facility] [address]    Jad Nicholas   Admissions 4/5/17     Phone: [phone] Fax: [phone]         [address] [facility] [address]    Bettye Vazquez Psychologist Psychology  4/5/17     Phone: [phone] Fax: [phone]        [facility]  [address] [place] [address]    Smitha Lee MD MD Neurology  4/5/17     Phone: [phone] Fax: [phone]         [facility] [address] [facility] [address]          My Wellness Plan  Crisis Plan (emergencies / when urgent support needed): Pt reports no safety concerns at this time. Pt is aware to contact emergency services and/or family/friends if any safety concerns were to arise.        Asher Gonzales co-developed the Health Action Plan with the [facility] Team and received a copy of this document.  Date Health Action Plan Completed/Updated: 04/05/17

## 2017-04-05 NOTE — PROGRESS NOTES
Behavioral Health Home Services   [facility] Care Navigator [facility]    Patient: Asher Gonzales  Date: April 5, 2017  Preferred Name: Asher    Previous PHQ-9:   PHQ-9 SCORE 2/17/2012 10/9/2012 3/24/2017   Total Score 8 0 -   Total Score - - 13     Previous FRANKLIN-7:   FRANKLIN-7 SCORE 3/24/2017   Total Score 10     MAVERICK LEVEL:  MAVERICK Score (Last Two) 1/27/2012 3/24/2017   MAVERICK Raw Score 37 36   Activation Score 49.9 75.5   MAVERICK Level 2 4       Preferred Contact:    Type of Contact: Face to Face in Clinic    Data: (subjective / Objective):    Patient came in to complete the comprehensive wellness assessment for Behavioral Health Home Services.  See [facility] Flowsheets for details on the assessment.  See [facility], Behavioral Health Home for a copy of the patient's care plan.    Jad Nicholas, Social Work Care Coordinator

## 2017-04-06 ASSESSMENT — ANXIETY QUESTIONNAIRES: GAD7 TOTAL SCORE: 5

## 2017-04-06 ASSESSMENT — PATIENT HEALTH QUESTIONNAIRE - PHQ9: SUM OF ALL RESPONSES TO PHQ QUESTIONS 1-9: 5

## 2017-04-10 ENCOUNTER — TELEPHONE (OUTPATIENT)
Dept: BEHAVIORAL HEALTH | Facility: CLINIC | Age: 42
End: 2017-04-10

## 2017-04-10 NOTE — TELEPHONE ENCOUNTER
Behavioral Health Home Services  @FLOW([id number])@    Social Work Care Navigator Note    Patient: Asher Gonzales  Date: April 10, 2017  Preferred Name: Asher    Previous PHQ-9:   PHQ-9 SCORE 10/9/2012 3/24/2017 4/5/2017   Total Score 0 - -   Total Score - 13 5     Previous FRANKLIN-7:   FRANKLIN-7 SCORE 3/24/2017 4/5/2017   Total Score 10 5     MAVERICK LEVEL:  MAVERICK Score (Last Two) 3/24/2017 4/5/2017   MAVERICK Raw Score 36 30   Activation Score 75.5 56   MAVERICK Level 4 3       Preferred Contact: @FLOW([id number])@  Type of Contact: Phone call (patient reached)    Data: (subjective / Objective):  Patient Goals Areas: @FLOW([id number])@  Patient Stated Goals: @FLOW([id number])@  Recent ED/IP Admission or Discharge?   None    Objectives Addressed Today:    [facility] returned phone call back to writer re: patient and phone call on 4/5/17. [facility] stated that pt would be unable to receive medication management at any [facility] clinic due to his behavior at clinic and alcohol use. Pt was referred to CD treatment; pt refused and believes he does not have a problem with alcohol.       Writer will follow up with patient in regards to this and discuss CD treatment & other options for medication management. Writer will continue to be in touch with [facility] as needed.      Jad Nicholas, Social Work Care Coordinator               Next 5 appointments (look out 90 days)     May 05, 2017 11:00 AM CDT   [facility] with Jad Nicholas   [facility]    [address] [facility] [address] [phone]

## 2017-04-10 NOTE — TELEPHONE ENCOUNTER
Behavioral Health Home Services  @FLOW([id number])@    Social Work Care Navigator Note    Patient: Asher Gonzales  Date: April 10, 2017  Preferred Name: Asher    Previous PHQ-9:   PHQ-9 SCORE 10/9/2012 3/24/2017 4/5/2017   Total Score 0 - -   Total Score - 13 5     Previous FRANKLIN-7:   FRANKLIN-7 SCORE 3/24/2017 4/5/2017   Total Score 10 5     MAVERICK LEVEL:  MAVERICK Score (Last Two) 3/24/2017 4/5/2017   MAVERICK Raw Score 36 30   Activation Score 75.5 56   MAVERICK Level 4 3       Preferred Contact: @FLOW([id number])@  Type of Contact: Phone call (patient reached)    Data: (subjective / Objective):  Patient Goals Areas: @FLOW([id number])@  Patient Stated Goals: @FLOW([id number])@  Recent ED/IP Admission or Discharge?   None    Objectives Addressed Today:    Writer contacted [facility] on 4/5/17 to inquire about medication management as pt had stated that they won't manage pt's medication per scanned chart note on 3/9/2017. Therapist and provider were out of office this day and would return call to writer when able.       Jad Nicholas, Social Work Care Coordinator               Next 5 appointments (look out 90 days)     May 05, 2017 11:00 AM CDT   [facility]   [facility]    [address] [facility] [address] [phone]

## 2017-04-11 ENCOUNTER — TRANSFERRED RECORDS (OUTPATIENT)
Dept: HEALTH INFORMATION MANAGEMENT | Facility: CLINIC | Age: 42
End: 2017-04-11

## 2017-04-14 ENCOUNTER — CARE COORDINATION (OUTPATIENT)
Dept: CARE COORDINATION | Facility: CLINIC | Age: 42
End: 2017-04-14

## 2017-04-14 NOTE — PROGRESS NOTES
Clinic Care Coordination Contact  [facility]/Voicemail    Referral Source: [facility] ([facility])  Clinical Data: Care Coordinator Outreach  Clinical Concerns:  Current Medical Concerns:   DC'd from [facility] on 3/18 to Home  Primary Problem: Subarachnoid hemorrhage  LOS: 1.7  Readmit Risk: High       Had post hospital follow visit with Roseann Dickson PA-C . Reviewed need to follow up with neurology for seizures-is onTegretol 200 mg bid for 2 weeks and is to then take 400 mg bid on-going. Continues to have rib and sternal discomfort r/t resuscitation during hospitalization.       1. Is to f/u with Dr [facility] in 2-3 weeks. Discussed scheduling this week of 4/10. Made this a goal for him to schedule this. Per patient He will increase Tegretol to 400 mg bid 4/1  2. Dr Baugh-Neurosurgeon. It was decided during hospitalization that no surgical intervention needed. No follow up needed  3.Referrals for PT-balance and OT-cognition were placed to Cynthia Argueta. Patient does not want to pursue this. At present he wants to focus on neurology follow ups.      Outreach attempted x 1.  Left message on voicemail with call back information and requested return call.    Plan: As above. Patient agreed to call me for any further needs. I will outreach him in 1 week     Toby Bhatia RN  Clinic Care Coordinator  [facility]  [phone]

## 2017-05-05 ENCOUNTER — OFFICE VISIT (OUTPATIENT)
Dept: BEHAVIORAL HEALTH | Facility: CLINIC | Age: 42
End: 2017-05-05
Payer: COMMERCIAL

## 2017-05-05 DIAGNOSIS — R69 DIAGNOSIS DEFERRED: Primary | ICD-10-CM

## 2017-05-05 PROCEDURE — 99207 ZZC NO CHARGE LOS: CPT

## 2017-05-05 NOTE — MR AVS SNAPSHOT
After Visit Summary   5/5/2017    Asher Gonzales    MRN: [id number]           Patient Information     Date Of Birth          1975        Visit Information        Provider Department      5/5/2017 11:00 AM Jad Nicholas [facility]        Today's Diagnoses     Diagnosis deferred    -  1       Follow-ups after your visit        Your next 10 appointments already scheduled     Jul 07, 2017 11:00 AM CDT   Return Visit with Jad Nicholas   [facility] ([facility])    [address] [facility] [address] [phone]              Who to contact     If you have questions or need follow up information about today's clinic visit or your schedule please contact [facility] directly at [phone].  Normal or non-critical lab and imaging results will be communicated to you by MyChart, letter or phone within 4 business days after the clinic has received the results. If you do not hear from us within 7 days, please contact the clinic through MyChart or phone. If you have a critical or abnormal lab result, we will notify you by phone as soon as possible.  Submit refill requests through [employer] or call your pharmacy and they will forward the refill request to us. Please allow 3 business days for your refill to be completed.          Additional Information About Your Visit        MyChart Information     [employer] gives you secure access to your electronic health record. If you see a primary care provider, you can also send messages to your care team and make appointments. If you have questions, please call your primary care clinic.  If you do not have a primary care provider, please call [phone] and they will assist you.        Care EveryWhere ID     This is your Care EveryWhere ID. This could be used by other organizations to access your [facility] medical records  [id number]         Blood Pressure from Last 3 Encounters:   03/24/17 118/84    01/09/17 122/77   01/03/17 122/82    Weight from Last 3 Encounters:   03/24/17 96.6 kg (213 lb)   01/09/17 96.6 kg (213 lb)   01/03/17 98.2 kg (216 lb 9.6 oz)              Today, you had the following     No orders found for display       Primary Care Provider Office Phone # Fax #    Angus Astorga [phone]       [facility] [address] [facility] [address]        Thank you!     Thank you for choosing [facility]  for your care. Our goal is always to provide you with excellent care. Hearing back from our patients is one way we can continue to improve our services. Please take a few minutes to complete the written survey that you may receive in the mail after your visit with us. Thank you!             Your Updated Medication List - Protect others around you: Learn how to safely use, store and throw away your medicines at www.disposemymeds.org.          This list is accurate as of: 5/5/17 11:33 AM.  Always use your most recent med list.                   Brand Name Dispense Instructions for use    albuterol 108 (90 BASE) MCG/ACT Inhaler    PROAIR HFA/PROVENTIL HFA/VENTOLIN HFA    1 Inhaler    Inhale 2 puffs into the lungs every 4 hours as needed for shortness of breath / dyspnea or wheezing       carBAMazepine 200 MG tablet    TEGretol     Take 200 mg by mouth       mirtazapine 30 MG tablet    REMERON    30 tablet    Take 1 tablet (30 mg) by mouth At Bedtime       oxyCODONE-acetaminophen 5-325 MG per tablet    PERCOCET    10 tablet    Take 1 tablet by mouth every 8 hours as needed for moderate to severe pain No refills. Try to wean down on use.       traZODone 150 MG tablet    DESYREL    30 tablet    Take 1 tablet (150 mg) by mouth At Bedtime

## 2017-05-05 NOTE — PROGRESS NOTES
Behavioral Health Home Services  [facility]: [place]    Social Work Care Navigator Note    Patient: Asher Gonzales  Date: May 5, 2017  Preferred Name: Asher    Previous PHQ-9:   PHQ-9 SCORE 10/9/2012 3/24/2017 4/5/2017   Total Score 0 - -   Total Score - 13 5     Previous FRANKLIN-7:   FRANKLIN-7 SCORE 3/24/2017 4/5/2017   Total Score 10 5     MAVERICK LEVEL:  MAVERICK Score (Last Two) 3/24/2017 4/5/2017   MAVERICK Raw Score 36 30   Activation Score 75.5 56   MAVERICK Level 4 3       Preferred Contact: Preferred Contact: Cell  Type of Contact: Face to Face in Clinic    Data: (subjective / Objective):  Goal Areas: Health;Mental Health;Employment / Volunteer;Financial and Social Service     Patient Goals:    Health: Goal is to follow up with neurology and their medical recommendations.       Mental Health: Goal is to continue with therapy appointments, follow their recommendations and interventions provided. Pt also lists goal as wanting to get some medication for depression & anxiety.       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       Financial: Goal is to potentially receive some benefits from the state.     Recent ED/IP Admission or Discharge?   None    Objectives Addressed Today:    HAP/ Letter Given to Patient     New Stressors    CD & Medication for MH    Current Stressors / Issues:    Pt states nothing; he has been doing all right since last visit.     Mental Health: Pt states this has been fine.    Intervention:  Motivational Interviewing: Open-ended questions and Reflections: simple and complex     Assessment: (Progress on Goals / Homework):    Pt states he has started to date someone and that has helped tremendously with mental health    Pt states he takes Paxil (anxiety) & Tegretol ( Medication for seizures; started back at the hospital ER visit)- these meds are working well for him. He states he does not need to talk with [facility] about  these medications as his PCP and Neurologist are taking care of. States he does not have alcohol issue.     Called with patient [facility] to schedule appt with psychologist Bettye Vazquez : set up appt for May 15th @ 9am.     Pt states he switched to [facility]; writer will re-fax eligibility form to them for [facility].     Writer will contact [facility] to discuss patient's financial situation & figure out why he is unable to get cash assistance as he does not have any income coming in.     Provided patient with HAP Letter & Encouraged to call with any questions or concerns.     Plan: (Homework, other):  Patient was encouraged to continue to seek condition-related information and education.      Scheduled a Phone follow up appointment with NABIL ROMERO in 4 weeks     Patient has set self-identified goals and will monitor progress until the next appointment on: 06/20/2017.      Also set up clinic visit for July 7th @ 11am for [facility] Follow Up.      aJd Nicholas, Social Work Care Coordinator

## 2017-05-23 ENCOUNTER — OFFICE VISIT (OUTPATIENT)
Dept: FAMILY MEDICINE | Facility: CLINIC | Age: 42
End: 2017-05-23
Payer: COMMERCIAL

## 2017-05-23 ENCOUNTER — RADIANT APPOINTMENT (OUTPATIENT)
Dept: GENERAL RADIOLOGY | Facility: CLINIC | Age: 42
End: 2017-05-23
Attending: FAMILY MEDICINE
Payer: COMMERCIAL

## 2017-05-23 VITALS
DIASTOLIC BLOOD PRESSURE: 66 MMHG | HEART RATE: 94 BPM | BODY MASS INDEX: 28.03 KG/M2 | TEMPERATURE: 97.8 F | SYSTOLIC BLOOD PRESSURE: 103 MMHG | OXYGEN SATURATION: 96 % | WEIGHT: 201 LBS

## 2017-05-23 DIAGNOSIS — R07.81 RIB PAIN ON LEFT SIDE: Primary | ICD-10-CM

## 2017-05-23 DIAGNOSIS — R07.81 RIB PAIN ON LEFT SIDE: ICD-10-CM

## 2017-05-23 PROCEDURE — 99213 OFFICE O/P EST LOW 20 MIN: CPT | Performed by: FAMILY MEDICINE

## 2017-05-23 PROCEDURE — 71101 X-RAY EXAM UNILAT RIBS/CHEST: CPT | Mod: LT

## 2017-05-23 NOTE — MR AVS SNAPSHOT
After Visit Summary   5/23/2017    Asher Gonzales    MRN: [id number]           Patient Information     Date Of Birth          1975        Visit Information        Provider Department      5/23/2017 3:10 PM Angus Astorga MD [facility]        Today's Diagnoses     Rib pain on left side    -  1      Care Instructions    1. Your x-ray showed at least one fracture - this will be read by the radiologist.    2. Take a deep breath every 15 minutes.    3. Rib pain usually takes a while to heal (may be up to several weeks) - so try to be patient.    4. Apply heat or ice as needed for comfort.    5. Take Tylenol and Ibuprofen as needed.     6. Come back as needed.          Follow-ups after your visit        Your next 10 appointments already scheduled     Jul 07, 2017 11:00 AM CDT   [facility] Jad [facility]   [facility]    [address] [facility] [address] [phone]              Who to contact     If you have questions or need follow up information about today's clinic visit or your schedule please contact [facility] directly at [phone].  Normal or non-critical lab and imaging results will be communicated to you by [employer], letter or phone within 4 business days after the clinic has received the results. If you do not hear from us within 7 days, please contact the clinic through [employer] or phone. If you have a critical or abnormal lab result, we will notify you by phone as soon as possible.  Submit refill requests through [employer] or call your pharmacy and they will forward the refill request to us. Please allow 3 business days for your refill to be completed.          Additional Information About Your Visit        MyChart Information     [employer] gives you secure access to your electronic health record. If you see a primary care provider, you can also send messages to your care team and make appointments. If  you have questions, please call your primary care clinic.  If you do not have a primary care provider, please call [phone] and they will assist you.        Care EveryWhere ID     This is your Care EveryWhere ID. This could be used by other organizations to access your [facility] medical records  [id number]        Your Vitals Were     Pulse Temperature Pulse Oximetry BMI (Body Mass Index)          94 97.8  F (36.6  C) (Oral) 96% 28.03 kg/m2         Blood Pressure from Last 3 Encounters:   05/23/17 103/66   03/24/17 118/84   01/09/17 122/77    Weight from Last 3 Encounters:   05/23/17 201 lb (91.2 kg)   03/24/17 213 lb (96.6 kg)   01/09/17 213 lb (96.6 kg)                 Today's Medication Changes          These changes are accurate as of: 5/23/17  4:09 PM.  If you have any questions, ask your nurse or doctor.               Stop taking these medicines if you haven't already. Please contact your care team if you have questions.     mirtazapine 30 MG tablet   Commonly known as:  REMERON   Stopped by:  Angus Astorga MD           oxyCODONE-acetaminophen 5-325 MG per tablet   Commonly known as:  PERCOCET   Stopped by:  Angus Astorga MD           traZODone 150 MG tablet   Commonly known as:  DESYREL   Stopped by:  Angus Astorga MD                    Primary Care Provider Office Phone # Fax #    Angus Astorga [phone]       [facility] [address] [place] [address]        Thank you!     Thank you for choosing [facility]  for your care. Our goal is always to provide you with excellent care. Hearing back from our patients is one way we can continue to improve our services. Please take a few minutes to complete the written survey that you may receive in the mail after your visit with us. Thank you!             Your Updated Medication List - Protect others around you: Learn how to safely use, store and throw away your medicines at www.disposemymeds.org.           This list is accurate as of: 5/23/17  4:09 PM.  Always use your most recent med list.                   Brand Name Dispense Instructions for use    albuterol 108 (90 BASE) MCG/ACT Inhaler    PROAIR HFA/PROVENTIL HFA/VENTOLIN HFA    1 Inhaler    Inhale 2 puffs into the lungs every 4 hours as needed for shortness of breath / dyspnea or wheezing       carBAMazepine 200 MG tablet    TEGretol     Take 200 mg by mouth       PAXIL PO      Take by mouth daily

## 2017-05-23 NOTE — NURSING NOTE
"Chief Complaint   Patient presents with     Rib pain       Initial /66  Pulse 94  Temp 97.8  F (36.6  C) (Oral)  Wt 201 lb (91.2 kg)  SpO2 96%  BMI 28.03 kg/m2 Estimated body mass index is 28.03 kg/(m^2) as calculated from the following:    Height as of 3/24/17: 5' 11\" (1.803 m).    Weight as of this encounter: 201 lb (91.2 kg).  Medication Reconciliation: complete     Irais Christian cma      "

## 2017-05-23 NOTE — PATIENT INSTRUCTIONS
1. Your x-ray showed at least one fracture - this will be read by the radiologist.    2. Take a deep breath every 15 minutes.    3. Rib pain usually takes a while to heal (may be up to several weeks) - so try to be patient.    4. Apply heat or ice as needed for comfort.    5. Take Tylenol and Ibuprofen as needed.     6. Come back as needed.

## 2017-06-20 ENCOUNTER — OFFICE VISIT (OUTPATIENT)
Dept: BEHAVIORAL HEALTH | Facility: CLINIC | Age: 42
End: 2017-06-20
Payer: COMMERCIAL

## 2017-06-20 ENCOUNTER — OFFICE VISIT (OUTPATIENT)
Dept: FAMILY MEDICINE | Facility: CLINIC | Age: 42
End: 2017-06-20
Payer: COMMERCIAL

## 2017-06-20 ENCOUNTER — RADIANT APPOINTMENT (OUTPATIENT)
Dept: GENERAL RADIOLOGY | Facility: CLINIC | Age: 42
End: 2017-06-20
Attending: PHYSICIAN ASSISTANT
Payer: COMMERCIAL

## 2017-06-20 ENCOUNTER — TELEPHONE (OUTPATIENT)
Dept: BEHAVIORAL HEALTH | Facility: CLINIC | Age: 42
End: 2017-06-20

## 2017-06-20 VITALS
OXYGEN SATURATION: 97 % | SYSTOLIC BLOOD PRESSURE: 134 MMHG | TEMPERATURE: 98.2 F | WEIGHT: 206 LBS | DIASTOLIC BLOOD PRESSURE: 88 MMHG | HEART RATE: 102 BPM | BODY MASS INDEX: 28.73 KG/M2 | RESPIRATION RATE: 15 BRPM

## 2017-06-20 DIAGNOSIS — K60.2 ANAL FISSURE: ICD-10-CM

## 2017-06-20 DIAGNOSIS — R05.9 COUGH: ICD-10-CM

## 2017-06-20 DIAGNOSIS — R07.1 PAINFUL RESPIRATION: ICD-10-CM

## 2017-06-20 DIAGNOSIS — J20.9 ACUTE BRONCHITIS WITH SYMPTOMS > 10 DAYS: Primary | ICD-10-CM

## 2017-06-20 DIAGNOSIS — R69 DIAGNOSIS DEFERRED: Primary | ICD-10-CM

## 2017-06-20 PROCEDURE — 99207 ZZC NO CHARGE LOS: CPT

## 2017-06-20 PROCEDURE — 99214 OFFICE O/P EST MOD 30 MIN: CPT | Performed by: PHYSICIAN ASSISTANT

## 2017-06-20 PROCEDURE — 71020 XR CHEST 2 VW: CPT

## 2017-06-20 RX ORDER — AZITHROMYCIN 250 MG/1
TABLET, FILM COATED ORAL
Qty: 6 TABLET | Refills: 0 | Status: SHIPPED | OUTPATIENT
Start: 2017-06-20 | End: 2017-07-11

## 2017-06-20 RX ORDER — NAPROXEN 500 MG/1
500 TABLET ORAL 2 TIMES DAILY PRN
Qty: 30 TABLET | Refills: 1 | Status: SHIPPED | OUTPATIENT
Start: 2017-06-20 | End: 2017-07-11
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
## 2017-06-20 NOTE — TELEPHONE ENCOUNTER
Behavioral Health Home Services  [facility]: [place]    Social Work Care Navigator Note    Patient: Asher Gonzales  Date: June 20, 2017  Preferred Name: Asher    Previous PHQ-9:   PHQ-9 SCORE 10/9/2012 3/24/2017 4/5/2017   Total Score 0 - -   Total Score - 13 5     Previous FRANKLIN-7:   FRANKLIN-7 SCORE 3/24/2017 4/5/2017   Total Score 10 5     MAVERICK LEVEL:  MAVERICK Score (Last Two) 3/24/2017 4/5/2017   MAVERICK Raw Score 36 30   Activation Score 75.5 56   MAVERICK Level 4 3     Preferred Contact:  Need for : No  Preferred Contact: Cell    Type of Contact Today: Phone call (not reached/unavailable)    Data: (subjective / Objective):    Attempted to reach patient, but was unsuccessful. Writer left  for patient to return phone call when able to as patient and writer had scheduled phone [facility] outreach for today @ 10am. Writer also had information from [facility] re: Economic Assistance.  Plan to attempt again.      NABIL Day        Next 5 appointments (look out 90 days)     Jun 20, 2017 12:40 PM CDT   [facility] Carole [facility]    [address] [facility] [address]   [phone]            Jul 07, 2017 11:00 AM CDT   [facility] with Jad Nicholas   [facility]    [address] [facility] [address]   [phone]

## 2017-06-20 NOTE — MR AVS SNAPSHOT
After Visit Summary   6/20/2017    Asher Gonzales    MRN: [id number]           Patient Information     Date Of Birth          1975        Visit Information        Provider Department      6/20/2017 12:40 PM Carole Rosenbaum PA-C [facility]        Today's Diagnoses     Painful respiration    -  1    Cough        Acute bronchitis with symptoms > 10 days        Anal fissure           Follow-ups after your visit        Your next 10 appointments already scheduled     Jul 07, 2017 11:00 AM CDT   Return Visit with Jad Nicholas   [facility]    [address] [facility] [address] [phone]              Who to contact     If you have questions or need follow up information about today's clinic visit or your schedule please contact [facility] directly at [phone].  Normal or non-critical lab and imaging results will be communicated to you by MyChart, letter or phone within 4 business days after the clinic has received the results. If you do not hear from us within 7 days, please contact the clinic through MyChart or phone. If you have a critical or abnormal lab result, we will notify you by phone as soon as possible.  Submit refill requests through [facility] or call your pharmacy and they will forward the refill request to us. Please allow 3 business days for your refill to be completed.          Additional Information About Your Visit        MyChart Information     [facility] gives you secure access to your electronic health record. If you see a primary care provider, you can also send messages to your care team and make appointments. If you have questions, please call your primary care clinic.  If you do not have a primary care provider, please call [phone] and they will assist you.        Care EveryWhere ID     This is your Care EveryWhere ID. This could be used by other organizations to access your [place]  medical records  [id number]        Your Vitals Were     Pulse Temperature Respirations Pulse Oximetry BMI (Body Mass Index)       102 98.2  F (36.8  C) (Oral) 15 97% 28.73 kg/m2        Blood Pressure from Last 3 Encounters:   06/20/17 134/88   05/23/17 103/66   03/24/17 118/84    Weight from Last 3 Encounters:   06/20/17 206 lb (93.4 kg)   05/23/17 201 lb (91.2 kg)   03/24/17 213 lb (96.6 kg)                 Today's Medication Changes          These changes are accurate as of: 6/20/17  2:02 PM.  If you have any questions, ask your nurse or doctor.               Start taking these medicines.        Dose/Directions    azithromycin 250 MG tablet   Commonly known as:  ZITHROMAX Z-CHAITANYA   Used for:  Acute bronchitis with symptoms > 10 days   Started by:  Carole Rosenbaum PA-C        2 tabs day one then 1 tab qd   Quantity:  6 tablet   Refills:  0       hydrocortisone 2.5 % cream   Commonly known as:  ANUSOL-HC   Used for:  Anal fissure   Started by:  Carole Rosenbaum PA-C        Place rectally 2 times daily   Quantity:  30 g   Refills:  0       naproxen 500 MG tablet   Commonly known as:  NAPROSYN   Used for:  Painful respiration   Started by:  Carole Rosenbaum PA-C        Dose:  500 mg   Take 1 tablet (500 mg) by mouth 2 times daily as needed for moderate pain   Quantity:  30 tablet   Refills:  1            Where to get your medicines      These medications were sent to [facility] [address] [facility] [address]  [address] [place], [place] [address] [address]     Phone:  [phone]     azithromycin 250 MG tablet    hydrocortisone 2.5 % cream    naproxen 500 MG tablet                Primary Care Provider Office Phone # Fax #    Angus Astorga [phone]       [address] [address]        Thank you!     Thank you for choosing [facility]  for your care. Our goal is always to provide you with excellent care.  Hearing back from our patients is one way we can continue to improve our services. Please take a few minutes to complete the written survey that you may receive in the mail after your visit with us. Thank you!             Your Updated Medication List - Protect others around you: Learn how to safely use, store and throw away your medicines at www.disposemymeds.org.          This list is accurate as of: 6/20/17  2:02 PM.  Always use your most recent med list.                   Brand Name Dispense Instructions for use    albuterol 108 (90 BASE) MCG/ACT Inhaler    PROAIR HFA/PROVENTIL HFA/VENTOLIN HFA    1 Inhaler    Inhale 2 puffs into the lungs every 4 hours as needed for shortness of breath / dyspnea or wheezing       azithromycin 250 MG tablet    ZITHROMAX Z-CHAITANYA    6 tablet    2 tabs day one then 1 tab qd       carBAMazepine 200 MG tablet    TEGretol     Take 200 mg by mouth       hydrocortisone 2.5 % cream    ANUSOL-HC    30 g    Place rectally 2 times daily       naproxen 500 MG tablet    NAPROSYN    30 tablet    Take 1 tablet (500 mg) by mouth 2 times daily as needed for moderate pain       PAXIL PO      Take by mouth daily

## 2017-06-20 NOTE — NURSING NOTE
"Chief Complaint   Patient presents with     Rectal Problem     pt want a refill of steroid gel      Shoulder Pain     c/o right shoulder pain x 2 days       Initial /88  Pulse 102  Temp 98.2  F (36.8  C) (Oral)  Resp 15  Wt 206 lb (93.4 kg)  SpO2 97%  BMI 28.73 kg/m2 Estimated body mass index is 28.73 kg/(m^2) as calculated from the following:    Height as of 3/24/17: 5' 11\" (1.803 m).    Weight as of this encounter: 206 lb (93.4 kg).  Medication Reconciliation: complete   Gareth Torres MA      "

## 2017-06-20 NOTE — PROGRESS NOTES
Behavioral Health Home Services  [facility]: [place]    Social Work Care Navigator Note    Patient: Asher Gonzales  Date: June 20, 2017  Preferred Name: Asher    Previous PHQ-9:   PHQ-9 SCORE 10/9/2012 3/24/2017 4/5/2017   Total Score 0 - -   Total Score - 13 5     Previous FRANKLIN-7:   FRANKLIN-7 SCORE 3/24/2017 4/5/2017   Total Score 10 5     MAVERICK LEVEL:  MAVERICK Score (Last Two) 3/24/2017 4/5/2017   MAVERICK Raw Score 36 30   Activation Score 75.5 56   MAVERICK Level 4 3     Preferred Contact:  Need for : No  Preferred Contact: Cell    Type of Contact Today: Face to Face in Clinic    Data: (subjective / Objective):  Recent ED/IP Admission or Discharge?   None    Patient Goals:  Goal Areas: Health;Mental Health;Employment / Volunteer;Financial and Social Service Benefits  Patient stated goals: Health: Goal is to follow up with neurology and their medical recommendations. Mental Health: Goal is to continue with therapy appointments, follow their recommendations and interventions provided. Pt also lists goal as wanting to get some medication for depression & anxiety. 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 Financial: Goal is to potentially receieve some benefits from the state.       [facility] Core Service Provided:  Care Coordination: provided care management services/referrals necessary to ensure patient and their identified supports have access to medical, behavioral health, pharmacology and recovery support services.  Ensured that patient's care is integrated across all settings and services.     Current Stressors / Issues / Care Plan Objective Addressed Today:    Unemployment     Intervention:  Motivational Interviewing: Expressed Empathy/Understanding, Supported Autonomy, Collaboration, Evocation and Open-ended questions     Assessment: (Progress on Goals / Homework):    Writer met with patient in clinic today; pt states he forgot  about our phone visit this AM however was in the clinic for a medical appointment and scheduled with writer also. Patient reports that he is doing well. His goal is to still locate a job and he has been going to the work force center in [place]. Writer will continue to help patient with this goal.       Writer informed pt that he is able to reapply for benefits through [facility] re: SNAP & Cash Assistance. Pt would like to do @ next OV with writer which is July 7th. Pt and writer to be in contact.       Writer encouraged pt to call with any questions or concerns in the mean time.     Plan: (Homework, other):  Patient was encouraged to continue to seek condition-related information and education.      Scheduled a Clinic follow up appointment with NABIL ROMERO in 2 weeks     Patient has set self-identified goals and will monitor progress until the next appointment on: 07/07/2017     Jad Nicholas, Social Work Care Coordinator         Next 5 appointments (look out 90 days)     Jul 07, 2017 11:00 AM CDT   [facility]    [address] [facility] [id number] [phone]

## 2017-06-20 NOTE — MR AVS SNAPSHOT
After Visit Summary   6/20/2017    Asher Gonzales    MRN: [id number]           Patient Information     Date Of Birth          1975        Visit Information        Provider Department      6/20/2017 12:00 PM Jad Nicholas [facility]        Today's Diagnoses     Diagnosis deferred    -  1       Follow-ups after your visit        Your next 10 appointments already scheduled     Jul 07, 2017 11:00 AM CDT   Return Visit with Jad Nicholas   [facility]    [address] [facility] [address] [phone]              Who to contact     If you have questions or need follow up information about today's clinic visit or your schedule please contact [facility] directly at [phone].  Normal or non-critical lab and imaging results will be communicated to you by MyChart, letter or phone within 4 business days after the clinic has received the results. If you do not hear from us within 7 days, please contact the clinic through MyChart or phone. If you have a critical or abnormal lab result, we will notify you by phone as soon as possible.  Submit refill requests through [facility] or call your pharmacy and they will forward the refill request to us. Please allow 3 business days for your refill to be completed.          Additional Information About Your Visit        MyChart Information     [facility] gives you secure access to your electronic health record. If you see a primary care provider, you can also send messages to your care team and make appointments. If you have questions, please call your primary care clinic.  If you do not have a primary care provider, please call [phone] and they will assist you.        Care EveryWhere ID     This is your Care EveryWhere ID. This could be used by other organizations to access your [place] medical records  [id number]         Blood Pressure from Last 3 Encounters:   06/20/17 134/88    05/23/17 103/66   03/24/17 118/84    Weight from Last 3 Encounters:   06/20/17 93.4 kg (206 lb)   05/23/17 91.2 kg (201 lb)   03/24/17 96.6 kg (213 lb)              Today, you had the following     No orders found for display         Today's Medication Changes          These changes are accurate as of: 6/20/17  2:36 PM.  If you have any questions, ask your nurse or doctor.               Start taking these medicines.        Dose/Directions    azithromycin 250 MG tablet   Commonly known as:  ZITHROMAX Z-CHAITANYA   Used for:  Acute bronchitis with symptoms > 10 days   Started by:  Carole Rosenbaum PA-C        2 tabs day one then 1 tab qd   Quantity:  6 tablet   Refills:  0       hydrocortisone 2.5 % cream   Commonly known as:  ANUSOL-HC   Used for:  Anal fissure   Started by:  Carole Rosenbaum PA-C        Place rectally 2 times daily   Quantity:  30 g   Refills:  0       naproxen 500 MG tablet   Commonly known as:  NAPROSYN   Used for:  Painful respiration   Started by:  Carole Rosenbaum PA-C        Dose:  500 mg   Take 1 tablet (500 mg) by mouth 2 times daily as needed for moderate pain   Quantity:  30 tablet   Refills:  1            Where to get your medicines      These medications were sent to [facility] [address] [place] [address], [facility] [address]  [id number] [place] [address]     Phone:  [phone]     azithromycin 250 MG tablet    hydrocortisone 2.5 % cream    naproxen 500 MG tablet                Primary Care Provider Office Phone # Fax #    Angus Astorga [phone]       [address] [address]        Thank you!     Thank you for choosing [facility]  for your care. Our goal is always to provide you with excellent care. Hearing back from our patients is one way we can continue to improve our services. Please take a few minutes to complete the written survey that you may receive in the mail after  your visit with us. Thank you!             Your Updated Medication List - Protect others around you: Learn how to safely use, store and throw away your medicines at www.disposemymeds.org.          This list is accurate as of: 6/20/17  2:36 PM.  Always use your most recent med list.                   Brand Name Dispense Instructions for use    albuterol 108 (90 BASE) MCG/ACT Inhaler    PROAIR HFA/PROVENTIL HFA/VENTOLIN HFA    1 Inhaler    Inhale 2 puffs into the lungs every 4 hours as needed for shortness of breath / dyspnea or wheezing       azithromycin 250 MG tablet    ZITHROMAX Z-CHAITANYA    6 tablet    2 tabs day one then 1 tab qd       carBAMazepine 200 MG tablet    TEGretol     Take 200 mg by mouth       hydrocortisone 2.5 % cream    ANUSOL-HC    30 g    Place rectally 2 times daily       naproxen 500 MG tablet    NAPROSYN    30 tablet    Take 1 tablet (500 mg) by mouth 2 times daily as needed for moderate pain       PAXIL PO      Take by mouth daily

## 2017-07-10 ENCOUNTER — TELEPHONE (OUTPATIENT)
Dept: BEHAVIORAL HEALTH | Facility: CLINIC | Age: 42
End: 2017-07-10

## 2017-07-10 NOTE — TELEPHONE ENCOUNTER
Behavioral Health Home Services  [facility]: [facility] Work Care Navigator Note    Patient: Asher Gonzales  Date: July 10, 2017  Preferred Name: Asher    Previous PHQ-9:   PHQ-9 SCORE 10/9/2012 3/24/2017 4/5/2017   Total Score 0 - -   Total Score - 13 5     Previous FRANKLIN-7:   FRANKLIN-7 SCORE 3/24/2017 4/5/2017   Total Score 10 5     MAVERICK LEVEL:  MAVERICK Score (Last Two) 3/24/2017 4/5/2017   MAVERICK Raw Score 36 30   Activation Score 75.5 56   MAVERICK Level 4 3     Preferred Contact:  Need for : No  Preferred Contact: Cell    Type of Contact Today: Phone call (not reached/unavailable)    Data: (subjective / Objective):    Attempted to reach patient, but was unsuccessful. Writer left VM for patient to call back in regards to scheduling OV with writer.  Plan to attempt again.      HAMZAH Day

## 2017-07-11 ENCOUNTER — OFFICE VISIT (OUTPATIENT)
Dept: FAMILY MEDICINE | Facility: CLINIC | Age: 42
End: 2017-07-11
Payer: COMMERCIAL

## 2017-07-11 VITALS
TEMPERATURE: 99 F | HEIGHT: 72 IN | OXYGEN SATURATION: 97 % | DIASTOLIC BLOOD PRESSURE: 60 MMHG | HEART RATE: 80 BPM | BODY MASS INDEX: 28.44 KG/M2 | SYSTOLIC BLOOD PRESSURE: 100 MMHG | WEIGHT: 210 LBS

## 2017-07-11 DIAGNOSIS — M10.9 GOUT OF FOOT, UNSPECIFIED CAUSE, UNSPECIFIED CHRONICITY, UNSPECIFIED LATERALITY: Primary | ICD-10-CM

## 2017-07-11 PROCEDURE — 99214 OFFICE O/P EST MOD 30 MIN: CPT | Performed by: PHYSICIAN ASSISTANT

## 2017-07-11 RX ORDER — ALLOPURINOL 100 MG/1
100 TABLET ORAL DAILY
Qty: 30 TABLET | Refills: 1 | Status: SHIPPED | OUTPATIENT
Start: 2017-07-11 | End: 2018-09-17

## 2017-07-11 RX ORDER — PREDNISONE 20 MG/1
TABLET ORAL
Qty: 20 TABLET | Refills: 0 | Status: SHIPPED | OUTPATIENT
Start: 2017-07-11 | End: 2017-07-31

## 2017-07-11 NOTE — LETTER
[facility]  [address] [facility] [address]  Phone: [phone]    July 11, 2017        Asher Gonzales  [address]  [facility] [address]          To whom it may concern:    RE: Asher Gonzales    Patient was seen and treated today at our clinic and missed work for acute illness.  They may be excused today and tomorrow, may return when symptoms improve.       Please contact me for questions or concerns.      Sincerely,        Roseann Dickosn PA-C

## 2017-07-11 NOTE — PATIENT INSTRUCTIONS
Continue to stay hydrated  Start allopurinol after you finish the prednisone and after all your symptoms are gone  Recheck labwork 4-6 weeks after starting the allopurinol  Take prednisone with food   Stop ibuprofen
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
## 2017-07-11 NOTE — NURSING NOTE
Chief Complaint   Patient presents with     Arthritis     GOUT on both feet per pt x 3-4 days now        Initial /60  Pulse 104  Temp 99  F (37.2  C) (Oral)  Ht 6' (1.829 m)  Wt 210 lb (95.3 kg)  SpO2 97%  BMI 28.48 kg/m2 Estimated body mass index is 28.48 kg/(m^2) as calculated from the following:    Height as of this encounter: 6' (1.829 m).    Weight as of this encounter: 210 lb (95.3 kg).  Medication Reconciliation: complete      Dayne Oscar MA

## 2017-07-11 NOTE — MR AVS SNAPSHOT
After Visit Summary   7/11/2017    Asher Gonzales    MRN: [id number]           Patient Information     Date Of Birth          1975        Visit Information        Provider Department      7/11/2017 1:00 PM Roseann Dickson PA-C [facility]        Today's Diagnoses     Gout of foot, unspecified cause, unspecified chronicity, unspecified laterality    -  1      Care Instructions    Continue to stay hydrated  Start allopurinol after you finish the prednisone and after all your symptoms are gone  Recheck labwork 4-6 weeks after starting the allopurinol  Take prednisone with food   Stop ibuprofen            Follow-ups after your visit        Who to contact     If you have questions or need follow up information about today's clinic visit or your schedule please contact [facility] directly at [phone].  Normal or non-critical lab and imaging results will be communicated to you by MyChart, letter or phone within 4 business days after the clinic has received the results. If you do not hear from us within 7 days, please contact the clinic through MyChart or phone. If you have a critical or abnormal lab result, we will notify you by phone as soon as possible.  Submit refill requests through [employer] or call your pharmacy and they will forward the refill request to us. Please allow 3 business days for your refill to be completed.          Additional Information About Your Visit        MyChart Information     [employer] gives you secure access to your electronic health record. If you see a primary care provider, you can also send messages to your care team and make appointments. If you have questions, please call your primary care clinic.  If you do not have a primary care provider, please call [phone] and they will assist you.        Care EveryWhere ID     This is your Care EveryWhere ID. This could be used by other organizations to access your [facility]  records  [id number]        Your Vitals Were     Pulse Temperature Height Pulse Oximetry BMI (Body Mass Index)       104 99  F (37.2  C) (Oral) 6' (1.829 m) 97% 28.48 kg/m2        Blood Pressure from Last 3 Encounters:   07/11/17 100/60   06/20/17 134/88   05/23/17 103/66    Weight from Last 3 Encounters:   07/11/17 210 lb (95.3 kg)   06/20/17 206 lb (93.4 kg)   05/23/17 201 lb (91.2 kg)              Today, you had the following     No orders found for display         Today's Medication Changes          These changes are accurate as of: 7/11/17  1:06 PM.  If you have any questions, ask your nurse or doctor.               Start taking these medicines.        Dose/Directions    allopurinol 100 MG tablet   Commonly known as:  ZYLOPRIM   Used for:  Gout of foot, unspecified cause, unspecified chronicity, unspecified laterality   Started by:  Roseann Dickson PA-C        Dose:  100 mg   Take 1 tablet (100 mg) by mouth daily   Quantity:  30 tablet   Refills:  1       predniSONE 20 MG tablet   Commonly known as:  DELTASONE   Used for:  Gout of foot, unspecified cause, unspecified chronicity, unspecified laterality   Started by:  Roseann Dickson PA-C        Take 3 tabs (60 mg) by mouth daily x 3 days, 2 tabs (40 mg) daily x 3 days, 1 tab (20 mg) daily x 3 days, then 1/2 tab (10 mg) x 3 days.   Quantity:  20 tablet   Refills:  0            Where to get your medicines      These medications were sent to [facility] [address] [facility], [address]  [address] [facility], [address] [address]     Phone:  [phone]     allopurinol 100 MG tablet    predniSONE 20 MG tablet                Primary Care Provider Office Phone # Fax #    Angus Astorga [phone]       [facility] [address] [facility] [address]        Equal Access to Services     [facility] EPHRAIM AH: Cindy Reeder, rigoberto campoverde, qaybta anthony de la rosa  dayne mojicachris ezequiellurdes garcia'aan ah. So [facility] [phone].    ATENCIÓN: Si rosie albert, tiene a wright disposición servicios gratuitos de asistencia lingüística. Liza al [phone].    We comply with applicable federal civil rights laws and [place] laws. We do not discriminate on the basis of race, color, national origin, age, disability sex, sexual orientation or gender identity.            Thank you!     Thank you for choosing [facility] [place] [facility]  for your care. Our goal is always to provide you with excellent care. Hearing back from our patients is one way we can continue to improve our services. Please take a few minutes to complete the written survey that you may receive in the mail after your visit with us. Thank you!             Your Updated Medication List - Protect others around you: Learn how to safely use, store and throw away your medicines at www.disposemymeds.org.          This list is accurate as of: 7/11/17  1:06 PM.  Always use your most recent med list.                   Brand Name Dispense Instructions for use Diagnosis    allopurinol 100 MG tablet    ZYLOPRIM    30 tablet    Take 1 tablet (100 mg) by mouth daily    Gout of foot, unspecified cause, unspecified chronicity, unspecified laterality       carBAMazepine 200 MG tablet    TEGretol     Take 200 mg by mouth        PAXIL PO      Take by mouth daily        predniSONE 20 MG tablet    DELTASONE    20 tablet    Take 3 tabs (60 mg) by mouth daily x 3 days, 2 tabs (40 mg) daily x 3 days, 1 tab (20 mg) daily x 3 days, then 1/2 tab (10 mg) x 3 days.    Gout of foot, unspecified cause, unspecified chronicity, unspecified laterality

## 2017-07-12 ENCOUNTER — TELEPHONE (OUTPATIENT)
Dept: BEHAVIORAL HEALTH | Facility: CLINIC | Age: 42
End: 2017-07-12

## 2017-07-12 NOTE — TELEPHONE ENCOUNTER
Behavioral Health Home Services  [facility]: [place]    Social Work Care Navigator Note    Patient: Asher Gonzales  Date: July 12, 2017  Preferred Name: Asher    Previous PHQ-9:   PHQ-9 SCORE 10/9/2012 3/24/2017 4/5/2017   Total Score 0 - -   Total Score - 13 5     Previous FRANKLIN-7:   FRANKLIN-7 SCORE 3/24/2017 4/5/2017   Total Score 10 5     MAVERICK LEVEL:  MAVERICK Score (Last Two) 3/24/2017 4/5/2017   MAVERICK Raw Score 36 30   Activation Score 75.5 56   MAVERICK Level 4 3     Preferred Contact:  Need for : No  Preferred Contact: Cell    Type of Contact Today: Phone call (patient / identified key support person reached)    Data: (subjective / Objective):  Recent ED/IP Admission or Discharge?   None    Patient Goals:  Goal Areas: Health;Mental Health;Employment / Volunteer;Financial and Social Service Benefits  Patient stated goals: Health: Goal is to follow up with neurology and their medical recommendations. Mental Health: Goal is to continue with therapy appointments, follow their recommendations and interventions provided. Pt also lists goal as wanting to get some medication for depression & anxiety. 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 Financial: Goal is to potentially receieve some benefits from the state.     [facility] Core Service Provided:  Care Coordination: provided care management services/referrals necessary to ensure patient and their identified supports have access to medical, behavioral health, pharmacology and recovery support services.  Ensured that patient's care is integrated across all settings and services.     Current Stressors / Issues / Care Plan Objective Addressed Today:    Not being able to see his kids    Intervention:  Motivational Interviewing: Expressed Empathy/Understanding, Permission to raise concern or advise, Open-ended questions and Reflections: simple and complex       Assessment: (Progress on Goals  / Homework):      Employment: Pt reports started a job at [email or web address] in [place] in the [employer] department. States he enjoys it. Works 50 hours / week.       Mental Health : Pt reports continuing to see his therapist. Primary goal right now is to work and collect income.      Pt states he does not need anything from writer @ this time; would like still pursue SNAP and Cash Assistance from [facility]. Writer and pt set up next appointment and will remain in contact.     Plan: (Homework, other):  Patient was encouraged to continue to seek condition-related information and education.      Scheduled a Clinic follow up appointment with NABIL ROMERO in 4 weeks     Patient has set self-identified goals and will monitor progress until the next appointment on: 08/11/2017.       Jad Nicholas, Social Work Care Coordinator

## 2017-07-31 ENCOUNTER — OFFICE VISIT (OUTPATIENT)
Dept: FAMILY MEDICINE | Facility: CLINIC | Age: 42
End: 2017-07-31
Payer: COMMERCIAL

## 2017-07-31 VITALS
OXYGEN SATURATION: 97 % | DIASTOLIC BLOOD PRESSURE: 83 MMHG | BODY MASS INDEX: 27.26 KG/M2 | SYSTOLIC BLOOD PRESSURE: 130 MMHG | HEART RATE: 94 BPM | TEMPERATURE: 98.4 F | WEIGHT: 201 LBS

## 2017-07-31 DIAGNOSIS — R07.0 THROAT PAIN: Primary | ICD-10-CM

## 2017-07-31 LAB
DEPRECATED S PYO AG THROAT QL EIA: NORMAL
MICRO REPORT STATUS: NORMAL
SPECIMEN SOURCE: NORMAL

## 2017-07-31 PROCEDURE — 87081 CULTURE SCREEN ONLY: CPT | Performed by: FAMILY MEDICINE

## 2017-07-31 PROCEDURE — 99213 OFFICE O/P EST LOW 20 MIN: CPT | Performed by: FAMILY MEDICINE

## 2017-07-31 PROCEDURE — 87880 STREP A ASSAY W/OPTIC: CPT | Performed by: FAMILY MEDICINE

## 2017-07-31 NOTE — LETTER
[facility]  [address] [facility] [address]  Phone: [phone]    July 31, 2017        Asher Gonzales  [address]  [facility] [address]        To whom it may concern:    RE: Asher Gonzales is out for 2 days due to illness. He will be able to return to work on 8/2/17. Please excuse his absence.    Please contact me for questions or concerns.      Sincerely,        MARIA INES ALFARO MD

## 2017-07-31 NOTE — PATIENT INSTRUCTIONS
1. The rapid strep test is negative. If the culture is positive, you will be contacted about treatment with antibiotics.    2. Otherwise I think you have a viral illness which should get better with time. Please use over the counter medications as needed.    3. If your symptoms persist or get worse, return to the clinic for re-evaluation.

## 2017-07-31 NOTE — NURSING NOTE
Chief Complaint   Patient presents with     Pharyngitis       Initial /83 (Cuff Size: Adult Regular)  Pulse 94  Temp 98.4  F (36.9  C) (Oral)  Wt 201 lb (91.2 kg)  SpO2 97%  BMI 27.26 kg/m2 Estimated body mass index is 27.26 kg/(m^2) as calculated from the following:    Height as of 7/11/17: 6' (1.829 m).    Weight as of this encounter: 201 lb (91.2 kg).  Medication Reconciliation: complete   Staff was masked during visit.    Leatha Duque LPN

## 2017-07-31 NOTE — MR AVS SNAPSHOT
After Visit Summary   7/31/2017    Asher Gonzales    MRN: [id number]           Patient Information     Date Of Birth          1975        Visit Information        Provider Department      7/31/2017 12:10 PM Angus Astorga MD [facility]        Today's Diagnoses     Throat pain    -  1      Care Instructions    1. The rapid strep test is negative. If the culture is positive, you will be contacted about treatment with antibiotics.    2. Otherwise I think you have a viral illness which should get better with time. Please use over the counter medications as needed.    3. If your symptoms persist or get worse, return to the clinic for re-evaluation.            Follow-ups after your visit        Your next 10 appointments already scheduled     Aug 11, 2017  4:00 PM CDT   Return Visit with Jad Nicholas   [facility]    [address] [facility] [address] [phone]              Who to contact     If you have questions or need follow up information about today's clinic visit or your schedule please contact [facility] directly at [phone].  Normal or non-critical lab and imaging results will be communicated to you by HipSwaphart, letter or phone within 4 business days after the clinic has received the results. If you do not hear from us within 7 days, please contact the clinic through HipSwaphart or phone. If you have a critical or abnormal lab result, we will notify you by phone as soon as possible.  Submit refill requests through [employer] or call your pharmacy and they will forward the refill request to us. Please allow 3 business days for your refill to be completed.          Additional Information About Your Visit        HipSwaphart Information     [employer] gives you secure access to your electronic health record. If you see a primary care provider, you can also send messages to your care team and make appointments. If you have  questions, please call your primary care clinic.  If you do not have a primary care provider, please call [phone] and they will assist you.        Care EveryWhere ID     This is your Care EveryWhere ID. This could be used by other organizations to access your [facility] medical records  [id number]        Your Vitals Were     Pulse Temperature Pulse Oximetry BMI (Body Mass Index)          94 98.4  F (36.9  C) (Oral) 97% 27.26 kg/m2         Blood Pressure from Last 3 Encounters:   07/31/17 130/83   07/11/17 100/60   06/20/17 134/88    Weight from Last 3 Encounters:   07/31/17 201 lb (91.2 kg)   07/11/17 210 lb (95.3 kg)   06/20/17 206 lb (93.4 kg)              We Performed the Following     Beta strep group A culture     Strep, Rapid Screen        Primary Care Provider Office Phone # Fax #    Angus Astorga [phone]       [facility] [address] [facility] [address]        Equal Access to Services     [facility] : Hadii aad ku hadasho Soomaali, waaxda luqadaha, qaybta kaalmada adeegyada, waxay idiin haytorstenn schuyler armas . So [facility] [phone].    ATENCIÓN: Si habla español, tiene a wright disposición servicios gratuitos de asistencia lingüística. Llame al [phone].    We comply with applicable federal civil rights laws and [place] laws. We do not discriminate on the basis of race, color, national origin, age, disability sex, sexual orientation or gender identity.            Thank you!     Thank you for choosing [facility]  for your care. Our goal is always to provide you with excellent care. Hearing back from our patients is one way we can continue to improve our services. Please take a few minutes to complete the written survey that you may receive in the mail after your visit with us. Thank you!             Your Updated Medication List - Protect others around you: Learn how to safely use, store and throw away your medicines at  www.disposemymeds.org.          This list is accurate as of: 7/31/17 12:48 PM.  Always use your most recent med list.                   Brand Name Dispense Instructions for use Diagnosis    allopurinol 100 MG tablet    ZYLOPRIM    30 tablet    Take 1 tablet (100 mg) by mouth daily    Gout of foot, unspecified cause, unspecified chronicity, unspecified laterality       carBAMazepine 200 MG tablet    TEGretol     Take 200 mg by mouth        PAXIL PO      Take by mouth daily

## 2017-07-31 NOTE — PROGRESS NOTES
HPI:    Asher Gonzales is an 42 year old male who presents for evaluation of cold symptoms.    Duration: one week  Fever: no  Cough: baseline smoker's cough  Shortness of breath: no  Sore throat: yes  Runny nose: yes  Ear pain: no    ROS:    Per HPI    SH:    smoker    Exam:    /83 (Cuff Size: Adult Regular)  Pulse 94  Temp 98.4  F (36.9  C) (Oral)  Wt 201 lb (91.2 kg)  SpO2 97%  BMI 27.26 kg/m2  Gen: Healthy appearing male in no acute distress. Here with girlfriend.  ENT: TM's normal. Oropharynx normal. Oral mucosa moist without lesions.  Eyes: Conjunctiva and sclera normal. Pupils react normally to light. No nystagmus.  Neck: No enlarged lymph nodes, thyromegally or other masses.  Lungs: Good air movement and otherwise clear.  CV: Heart RRR with no murmurs.     Assessment and Plan - Decision Making    1. Throat pain  See below.  - Strep, Rapid Screen  - Beta strep group A culture      Written instructions given as follows:    Patient Instructions   1. The rapid strep test is negative. If the culture is positive, you will be contacted about treatment with antibiotics.    2. Otherwise I think you have a viral illness which should get better with time. Please use over the counter medications as needed.    3. If your symptoms persist or get worse, return to the clinic for re-evaluation.

## 2017-08-01 LAB
BACTERIA SPEC CULT: NORMAL
MICRO REPORT STATUS: NORMAL
SPECIMEN SOURCE: NORMAL

## 2017-08-04 ENCOUNTER — TRANSFERRED RECORDS (OUTPATIENT)
Dept: HEALTH INFORMATION MANAGEMENT | Facility: CLINIC | Age: 42
End: 2017-08-04

## 2017-08-11 ENCOUNTER — TELEPHONE (OUTPATIENT)
Dept: BEHAVIORAL HEALTH | Facility: CLINIC | Age: 42
End: 2017-08-11

## 2017-08-11 NOTE — TELEPHONE ENCOUNTER
Behavioral Health Home Services  [facility]: [facility] Work Care Navigator Note    Patient: Asher Gonzales  Date: August 11, 2017  Preferred Name: Asher    Previous PHQ-9:   PHQ-9 SCORE 10/9/2012 3/24/2017 4/5/2017   Total Score 0 - -   Total Score - 13 5     Previous FRANKLIN-7:   FRANKLIN-7 SCORE 3/24/2017 4/5/2017   Total Score 10 5     MAVERICK LEVEL:  MAVERICK Score (Last Two) 3/24/2017 4/5/2017   MAVERICK Raw Score 36 30   Activation Score 75.5 56   MAVERICK Level 4 3     Preferred Contact:  Need for : No  Preferred Contact: Cell    Type of Contact Today: Phone call (not reached/unavailable)    Data: (subjective / Objective):    Patient called writer & left a voicemail requesting phone call for today's appointment vs. OV. Writer made adjustment to her schedule and will call patient @ 3pm. Will discuss option of scheduling OV later this month as an OV is due; locate a time that works best for patient.     Writer to follow up with patient.     HAMZAH Day

## 2017-08-11 NOTE — TELEPHONE ENCOUNTER
Behavioral Health Home Services  [facility]: [facility] Work Care Navigator Note    Patient: Asher Gonzales  Date: August 11, 2017  Preferred Name: Asher    Previous PHQ-9:   PHQ-9 SCORE 10/9/2012 3/24/2017 4/5/2017   Total Score 0 - -   Total Score - 13 5     Previous FRANKLIN-7:   FRANKLIN-7 SCORE 3/24/2017 4/5/2017   Total Score 10 5     MAVERICK LEVEL:  MAVERICK Score (Last Two) 3/24/2017 4/5/2017   MAVERICK Raw Score 36 30   Activation Score 75.5 56   MAVERICK Level 4 3     Preferred Contact:  Need for : No  Preferred Contact: Cell    Type of Contact Today: Phone call (not reached/unavailable)    Data: (subjective / Objective):    Attempted to reach patient, but was unsuccessful.  Writer attempted to call patient for phone call re: [facility] Follow up. No answer and left VM. Writer plan to attempt again.      HAMZAH Day

## 2017-09-21 ENCOUNTER — TELEPHONE (OUTPATIENT)
Dept: BEHAVIORAL HEALTH | Facility: CLINIC | Age: 42
End: 2017-09-21

## 2017-09-21 NOTE — TELEPHONE ENCOUNTER
Behavioral Health Home Services  [facility]: [place]    Social Work Care Navigator Note    Patient: Asher Gonzales  Date: September 21, 2017  Preferred Name: Asher    Previous PHQ-9:   PHQ-9 SCORE 10/9/2012 3/24/2017 4/5/2017   Total Score 0 - -   Total Score - 13 5     Previous FRANKLIN-7:   FRANKLIN-7 SCORE 3/24/2017 4/5/2017   Total Score 10 5     MAVERICK LEVEL:  MAVERICK Score (Last Two) 3/24/2017 4/5/2017   MAVERICK Raw Score 36 30   Activation Score 75.5 56   AMVERICK Level 4 3     Preferred Contact:  Need for : No  Preferred Contact: Cell    Type of Contact Today: Phone call (patient / identified key support person reached)    Data: (subjective / Objective):  Recent ED/IP Admission or Discharge?   None    Patient Goals:  Goal Areas: Health;Mental Health;Employment / Volunteer;Financial and Social Service Benefits  Patient stated goals: Health: Goal is to follow up with neurology and their medical recommendations. Mental Health: Goal is to continue with therapy appointments, follow their recommendations and interventions provided. Pt also lists goal as wanting to get some medication for depression & anxiety. 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 Financial: Goal is to potentially receive some benefits from the state.     [facility] Core Service Provided:  Care Coordination: provided care management services/referrals necessary to ensure patient and their identified supports have access to medical, behavioral health, pharmacology and recovery support services.  Ensured that patient's care is integrated across all settings and services.   Health and Wellness Promotion    Current Stressors / Issues / Care Plan Objective Addressed Today:    None    Intervention:  Motivational Interviewing: Open-ended questions     Assessment: (Progress on Goals / Homework):      Patient reports he is doing well right now; has no concerns at this time. [facility]  he is unsure if he will continue with the [facility] program. He would like to think about it and notify writer of decision. Writer informed patient that should he call after October 5th another member of the [facility] team will be returning his phone call; patient okay with this. Will leave patient as enrolled right now until patient notifies otherwise.     Plan: (Homework, other):  Patient was encouraged to continue to seek condition-related information and education.       Jad Nicholas, Social Work Care Coordinator

## 2017-10-03 ENCOUNTER — OFFICE VISIT (OUTPATIENT)
Dept: FAMILY MEDICINE | Facility: CLINIC | Age: 42
End: 2017-10-03
Payer: COMMERCIAL

## 2017-10-03 VITALS
SYSTOLIC BLOOD PRESSURE: 123 MMHG | WEIGHT: 217 LBS | TEMPERATURE: 99.3 F | HEART RATE: 95 BPM | BODY MASS INDEX: 29.43 KG/M2 | OXYGEN SATURATION: 98 % | DIASTOLIC BLOOD PRESSURE: 78 MMHG

## 2017-10-03 DIAGNOSIS — M53.3 SACROILIAC JOINT PAIN: Primary | ICD-10-CM

## 2017-10-03 PROCEDURE — 99213 OFFICE O/P EST LOW 20 MIN: CPT | Performed by: PHYSICIAN ASSISTANT

## 2017-10-03 RX ORDER — ETODOLAC 500 MG
500 TABLET ORAL 2 TIMES DAILY
Qty: 30 TABLET | Refills: 0 | Status: SHIPPED | OUTPATIENT
Start: 2017-10-03 | End: 2018-09-17

## 2017-10-03 NOTE — MR AVS SNAPSHOT
After Visit Summary   10/3/2017    Asher Gonzales    MRN: [id number]           Patient Information     Date Of Birth          1975        Visit Information        Provider Department      10/3/2017 2:50 PM Nirmala Poole PA-C [facility]        Today's Diagnoses     Sacroiliac joint pain    -  1      Care Instructions    Rest the back. No heavy lifting or straining until your pain has resolved.     May slowly increased activities as tolerated. If something causes you pain, you are doing too much.    May take tylenol as needed for discomfort.    Do not take any other NSAIDs (ibuprofen, advil, aleve, naprosyn, etc) while taking the Etodolac.    Apply ice multiple times daily for the first 2-3 days. Then, alternate ice and heat multiple times daily. Ice will help to decrease swelling and pain. Heat will help to relax the muscles.    Follow up with physical therapy for evaluation and treatment of your back pain.    Follow up with chiropractic therapy for your back pain.     Follow up with primary care provider in 2-3 weeks if no improvement of symptoms. Sooner if any worsening of symptoms.    Go to the Emergency Department if any weakness, numbness, loss of control of your bowel or bladder, severe worsening pain, or any other concerning symptoms.             Follow-ups after your visit        Additional Services     MANDEEP PT, HAND, AND CHIROPRACTIC REFERRAL       **This order will print in the [facility] Scheduling Office**    Physical Therapy, Hand Therapy and Chiropractic Care are available through:    *[facility]  *[facility]  *[facility]    Call one number to schedule at any of the above locations: [phone].    Your provider has referred you to: Physical Therapy at [facility] or [facility]    Indication/Reason for Referral: left SI joint pain  Onset of Illness: ongoing  Therapy Orders: Evaluate and Treat  Special Programs:  None  Special Request: None    María Elena Ring      Additional Comments for the Therapist or Chiropractor: none    Please be aware that coverage of these services is subject to the terms and limitations of your health insurance plan.  Call member services at your health plan with any benefit or coverage questions.      Please bring the following to your appointment:    *Your personal calendar for scheduling future appointments  *Comfortable clothing                  Who to contact     If you have questions or need follow up information about today's clinic visit or your schedule please contact [facility] directly at [phone].  Normal or non-critical lab and imaging results will be communicated to you by Gynzyhart, letter or phone within 4 business days after the clinic has received the results. If you do not hear from us within 7 days, please contact the clinic through Kickstarter[employer] or phone. If you have a critical or abnormal lab result, we will notify you by phone as soon as possible.  Submit refill requests through [employer] or call your pharmacy and they will forward the refill request to us. Please allow 3 business days for your refill to be completed.          Additional Information About Your Visit        Gynzyhart Information     [employer] gives you secure access to your electronic health record. If you see a primary care provider, you can also send messages to your care team and make appointments. If you have questions, please call your primary care clinic.  If you do not have a primary care provider, please call [phone] and they will assist you.        Care EveryWhere ID     This is your Care EveryWhere ID. This could be used by other organizations to access your [facility] medical records  [id number]        Your Vitals Were     Pulse Temperature Pulse Oximetry BMI (Body Mass Index)          95 99.3  F (37.4  C) (Oral) 98% 29.43 kg/m2         Blood Pressure from Last 3 Encounters:   10/03/17 123/78    07/31/17 130/83   07/11/17 100/60    Weight from Last 3 Encounters:   10/03/17 217 lb (98.4 kg)   07/31/17 201 lb (91.2 kg)   07/11/17 210 lb (95.3 kg)              We Performed the Following     MANDEEP PT, HAND, AND CHIROPRACTIC REFERRAL          Today's Medication Changes          These changes are accurate as of: 10/3/17  3:30 PM.  If you have any questions, ask your nurse or doctor.               Start taking these medicines.        Dose/Directions    etodolac 500 MG tablet   Commonly known as:  LODINE   Used for:  Sacroiliac joint pain   Started by:  Nirmala Poole PA-C        Dose:  500 mg   Take 1 tablet (500 mg) by mouth 2 times daily   Quantity:  30 tablet   Refills:  0            Where to get your medicines      These medications were sent to [facility] [address], [facility] [address]  [id number] [place] [address], [facility] [address]     Phone:  [phone]     etodolac 500 MG tablet                Primary Care Provider Office Phone # Fax #    Angus Astorga [phone]       [address] [place] [address]        Equal Access to Services     CAROLE YE AH: Hadii tommie rivera hadpamo Sosilvioali, waaxda luqadaha, qaybta kaalmada adeegyada, anthony isabel. So [facility] [place] [phone].    ATENCIÓN: Si habla español, tiene a wright disposición servicios gratuitos de asistencia lingüística. Llame al [phone].    We comply with applicable federal civil rights laws and [place] laws. We do not discriminate on the basis of race, color, national origin, age, disability, sex, sexual orientation, or gender identity.            Thank you!     Thank you for choosing [facility]  for your care. Our goal is always to provide you with excellent care. Hearing back from our patients is one way we can continue to improve our services. Please take a few minutes to complete the written survey that you may receive in the mail after your visit  with us. Thank you!             Your Updated Medication List - Protect others around you: Learn how to safely use, store and throw away your medicines at www.disposemymeds.org.          This list is accurate as of: 10/3/17  3:30 PM.  Always use your most recent med list.                   Brand Name Dispense Instructions for use Diagnosis    allopurinol 100 MG tablet    ZYLOPRIM    30 tablet    Take 1 tablet (100 mg) by mouth daily    Gout of foot, unspecified cause, unspecified chronicity, unspecified laterality       carBAMazepine 200 MG tablet    TEGretol     Take 200 mg by mouth        etodolac 500 MG tablet    LODINE    30 tablet    Take 1 tablet (500 mg) by mouth 2 times daily    Sacroiliac joint pain       PAXIL PO      Take by mouth daily
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
## 2017-10-03 NOTE — NURSING NOTE
Chief Complaint   Patient presents with     Back Pain     left lower back       Initial /78  Pulse 95  Temp 99.3  F (37.4  C) (Oral)  Wt 217 lb (98.4 kg)  SpO2 98%  BMI 29.43 kg/m2 Estimated body mass index is 29.43 kg/(m^2) as calculated from the following:    Height as of 7/11/17: 6' (1.829 m).    Weight as of this encounter: 217 lb (98.4 kg).  Medication Reconciliation: jeremy Dawkins MA

## 2017-10-19 ENCOUNTER — TELEPHONE (OUTPATIENT)
Dept: BEHAVIORAL HEALTH | Facility: CLINIC | Age: 42
End: 2017-10-19

## 2017-10-19 NOTE — TELEPHONE ENCOUNTER
Behavioral Health Home Services  [facility]: [place]      Community Health Worker Note      Patient: Asher Gonzales  Date: October 19, 2017  Preferred Name: Asher    Previous PHQ-9:   PHQ-9 SCORE 10/9/2012 3/24/2017 4/5/2017   Total Score 0 - -   Total Score - 13 5     Previous FRANKLIN-7:   FRANKLIN-7 SCORE 3/24/2017 4/5/2017   Total Score 10 5     MAVERICK LEVEL:  MAVERICK Score (Last Two) 3/24/2017 4/5/2017   MAVERICK Raw Score 36 30   Activation Score 75.5 56   MAVERICK Level 4 3       Preferred Contact:  Need for : No  Preferred Contact: Cell    Type of Contact Today: Phone call (patient / identified key support person reached)      Data: (Subjective / Objective):  Recent ED/IP Admission or Discharge?   None    Patient Goals:  Goal Areas: Health;Mental Health;Employment / Volunteer;Financial and Social Service Benefits  Patient stated goals: Health: Goal is to follow up with neurology and their medical recommendations. Mental Health: Goal is to continue with therapy appointments, follow their recommendations and interventions provided. Pt also lists goal as wanting to get some medication for depression & anxiety. 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 Financial: Goal is to potentially receieve some benefits from the state.     [facility] Core Service Provided:  Comprehensive Care Management: utilized the electronic medical record / patient registry to identify and support patient's health conditions / needs more effectively     Current Reported Stressors / Issues / Health Action Plan Items Addressed Today:  CHW call pt for monthly [facility] outreach. Pt reported he is doing well and needs no additional support from [facility] at this time. CHW encouraged pt to call [facility] if he should need anything as CHW checks it daily. Pt stated he would and thanked CHW for calling.        Nirmala Rehman, Community Health Worker

## 2017-11-15 ENCOUNTER — THERAPY VISIT (OUTPATIENT)
Dept: CHIROPRACTIC MEDICINE | Facility: CLINIC | Age: 42
End: 2017-11-15
Payer: COMMERCIAL

## 2017-11-15 ENCOUNTER — TELEPHONE (OUTPATIENT)
Dept: BEHAVIORAL HEALTH | Facility: CLINIC | Age: 42
End: 2017-11-15

## 2017-11-15 DIAGNOSIS — M62.838 SPASM OF MUSCLE: ICD-10-CM

## 2017-11-15 DIAGNOSIS — M99.05 SEGMENTAL DYSFUNCTION OF PELVIC REGION: Primary | ICD-10-CM

## 2017-11-15 DIAGNOSIS — M54.50 LUMBAGO: ICD-10-CM

## 2017-11-15 DIAGNOSIS — M99.03 SEGMENTAL DYSFUNCTION OF LUMBAR REGION: ICD-10-CM

## 2017-11-15 PROCEDURE — 99203 OFFICE O/P NEW LOW 30 MIN: CPT | Mod: 25 | Performed by: CHIROPRACTOR

## 2017-11-15 PROCEDURE — 98940 CHIROPRACT MANJ 1-2 REGIONS: CPT | Mod: AT | Performed by: CHIROPRACTOR
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
## 2017-11-21 ENCOUNTER — THERAPY VISIT (OUTPATIENT)
Dept: CHIROPRACTIC MEDICINE | Facility: CLINIC | Age: 42
End: 2017-11-21
Payer: COMMERCIAL

## 2017-11-21 DIAGNOSIS — M54.50 LUMBAGO: ICD-10-CM

## 2017-11-21 DIAGNOSIS — M99.05 SEGMENTAL DYSFUNCTION OF PELVIC REGION: Primary | ICD-10-CM

## 2017-11-21 DIAGNOSIS — M99.03 SEGMENTAL DYSFUNCTION OF LUMBAR REGION: ICD-10-CM

## 2017-11-21 DIAGNOSIS — M62.838 SPASM OF MUSCLE: ICD-10-CM

## 2017-11-21 PROCEDURE — 98940 CHIROPRACT MANJ 1-2 REGIONS: CPT | Mod: AT | Performed by: CHIROPRACTOR

## 2017-11-21 NOTE — PROGRESS NOTES
Visit #:  2 of 6, based on treatment plan    Subjective:  Asher Gonzales is a 42 year old male who is seen in f/u up for:        Segmental dysfunction of pelvic region  Lumbago  Segmental dysfunction of lumbar region  Spasm of muscle.     Since last visit on 11/15/2017,  Asher Gonzales reports the following changes: Pain immediately after last treatment: 4/10 and their pain level today 6/10.  Asher reports that he is feeling about the same.  There has been no improvement.    Area of chief complaint:  Lumbar :  Symptoms are graded at 6/10. The quality is described as stiff, achey, dull.  Motion has remained about the same, no improvement. Patient feels that they have not improved and feel the same.        Objective:  The following was observed:    P: palpatory tenderness, left lower lumbar ererctor    A: static palpation demonstrates intersegmental asymmetry , pelvis, lumbar    R: motion palpation notes restricted motion, :  L4 Right rotation restricted  L5 Right rotation restricted  PSIS Left Flexion restriction    T: hypertonicity at: Lumbar erector spine L>>R      Assessment:    Segmental spinal dysfunction/restrictions found at:  L4  L5  PSIS Left    Diagnoses:      1. Segmental dysfunction of pelvic region    2. Lumbago    3. Segmental dysfunction of lumbar region    4. Spasm of muscle        Patient's condition:  Patient had restrictions pre-manipulation    Treatment effectiveness:  Post manipulation there is better intersegmental movement and Patient claims to feel looser post manipulation      Procedures:  CMT:  22709 Chiropractic manipulative treatment 1-2 regions performed   Lumbar: Activator, L4, L5, Prone  Pelvis: Activator, PSIS Left , Prone    Modalities:  98464: Electrical Stim:  80-120HZ at 18 internsity for 8 minutes,  Location:lower left lumbar erector    Therapeutic procedures:  None      Prognosis: Good    Progress towards Goals: Patient is making progress towards the goal     Response to  Treatment:   Reduction in symptoms as reported by patient      Recommendations:    Instructions:ice 20 minutes every other hour as needed    Follow-up:  Return to care in one week

## 2017-11-29 ENCOUNTER — OFFICE VISIT (OUTPATIENT)
Dept: FAMILY MEDICINE | Facility: CLINIC | Age: 42
End: 2017-11-29
Payer: COMMERCIAL

## 2017-11-29 VITALS
WEIGHT: 216 LBS | HEART RATE: 123 BPM | OXYGEN SATURATION: 97 % | DIASTOLIC BLOOD PRESSURE: 80 MMHG | SYSTOLIC BLOOD PRESSURE: 130 MMHG | BODY MASS INDEX: 29.29 KG/M2

## 2017-11-29 DIAGNOSIS — R21 RASH: Primary | ICD-10-CM

## 2017-11-29 PROCEDURE — 99213 OFFICE O/P EST LOW 20 MIN: CPT | Performed by: FAMILY MEDICINE

## 2017-11-29 RX ORDER — PERMETHRIN 50 MG/G
CREAM TOPICAL
Qty: 60 G | Refills: 1 | Status: SHIPPED | OUTPATIENT
Start: 2017-11-29 | End: 2018-09-17

## 2017-11-29 NOTE — MR AVS SNAPSHOT
After Visit Summary   11/29/2017    Asher Gonzales    MRN: [id number]           Patient Information     Date Of Birth          1975        Visit Information        Provider Department      11/29/2017 2:10 PM Angus Astorga MD [facility]        Today's Diagnoses     Rash    -  1      Care Instructions    1. Apply Permethrin on the entire body below the neck. Wash off 8-14 hours later. Repeat in one week.    2. Wash sheets and clothing at 140 F (60 C) and drying in a hot dryer. For items that cannot be machine washed, isolation in a plastic bag for at least 72 hours is sufficient.    3. Avoid skin to skin contact until the rash is gone.    4. Remember that the itching can continue to for two weeks after the second treatment.    5. If the symptoms persist over that time, please come back for re-evaluation. Sooner if worse.            Follow-ups after your visit        Who to contact     If you have questions or need follow up information about today's clinic visit or your schedule please contact [facility] directly at [phone].  Normal or non-critical lab and imaging results will be communicated to you by XDxhart, letter or phone within 4 business days after the clinic has received the results. If you do not hear from us within 7 days, please contact the clinic through [place] or phone. If you have a critical or abnormal lab result, we will notify you by phone as soon as possible.  Submit refill requests through TandemLaunch or call your pharmacy and they will forward the refill request to us. Please allow 3 business days for your refill to be completed.          Additional Information About Your Visit        XDxhart Information     TandemLaunch gives you secure access to your electronic health record. If you see a primary care provider, you can also send messages to your care team and make appointments. If you have questions, please call your primary care clinic.  If you do  not have a primary care provider, please call [phone] and they will assist you.        Care EveryWhere ID     This is your Care EveryWhere ID. This could be used by other organizations to access your [facility] medical records  [id number]        Your Vitals Were     Pulse Pulse Oximetry BMI (Body Mass Index)             123 97% 29.29 kg/m2          Blood Pressure from Last 3 Encounters:   10/03/17 123/78   07/31/17 130/83   07/11/17 100/60    Weight from Last 3 Encounters:   11/29/17 216 lb (98 kg)   10/03/17 217 lb (98.4 kg)   07/31/17 201 lb (91.2 kg)              Today, you had the following     No orders found for display         Today's Medication Changes          These changes are accurate as of: 11/29/17  2:48 PM.  If you have any questions, ask your nurse or doctor.               Start taking these medicines.        Dose/Directions    permethrin 5 % cream   Commonly known as:  ELIMITE   Used for:  Rash   Started by:  Angus Astorga MD        Apply cream from head to toe (except the face); leave on for 8-14 hours then wash off with water; reapply in 1 week if live mites appear.   Quantity:  60 g   Refills:  1            Where to get your medicines      These medications were sent to [facility] [address], [facility] [address]  [address] [place], [place] [id number]     Phone:  [phone]     permethrin 5 % cream                Primary Care Provider Office Phone # Fax #    Angus Astorga [phone]       [address] [place] [address]        Equal Access to Services     ALLY YE : Hadii tommie rivera hadasho Sohossein, waaxda luqadaha, qaybta kaalmada anthony gar. So [facility] [phone].    ATENCIÓN: Si habla español, tiene a wright disposición servicios gratuitos de asistencia lingüística. Llame al [phone].    We comply with applicable federal civil rights laws and [place] laws. We do not discriminate  on the basis of race, color, national origin, age, disability, sex, sexual orientation, or gender identity.            Thank you!     Thank you for choosing [facility]  for your care. Our goal is always to provide you with excellent care. Hearing back from our patients is one way we can continue to improve our services. Please take a few minutes to complete the written survey that you may receive in the mail after your visit with us. Thank you!             Your Updated Medication List - Protect others around you: Learn how to safely use, store and throw away your medicines at www.disposemymeds.org.          This list is accurate as of: 11/29/17  2:48 PM.  Always use your most recent med list.                   Brand Name Dispense Instructions for use Diagnosis    allopurinol 100 MG tablet    ZYLOPRIM    30 tablet    Take 1 tablet (100 mg) by mouth daily    Gout of foot, unspecified cause, unspecified chronicity, unspecified laterality       carBAMazepine 200 MG tablet    TEGretol     Take 200 mg by mouth        etodolac 500 MG tablet    LODINE    30 tablet    Take 1 tablet (500 mg) by mouth 2 times daily    Sacroiliac joint pain       PAXIL PO      Take by mouth daily        permethrin 5 % cream    ELIMITE    60 g    Apply cream from head to toe (except the face); leave on for 8-14 hours then wash off with water; reapply in 1 week if live mites appear.    Rash

## 2017-11-29 NOTE — PATIENT INSTRUCTIONS
1. Apply Permethrin on the entire body below the neck. Wash off 8-14 hours later. Repeat in one week.    2. Wash sheets and clothing at 140 F (60 C) and drying in a hot dryer. For items that cannot be machine washed, isolation in a plastic bag for at least 72 hours is sufficient.    3. Avoid skin to skin contact until the rash is gone.    4. Remember that the itching can continue to for two weeks after the second treatment.    5. If the symptoms persist over that time, please come back for re-evaluation. Sooner if worse.

## 2017-11-29 NOTE — NURSING NOTE
Chief Complaint   Patient presents with     Derm Problem     possible scabies        Initial /80 (Cuff Size: Adult Regular)  Pulse 123  Wt 216 lb (98 kg)  SpO2 97%  BMI 29.29 kg/m2 Estimated body mass index is 29.29 kg/(m^2) as calculated from the following:    Height as of 7/11/17: 6' (1.829 m).    Weight as of this encounter: 216 lb (98 kg).  Medication Reconciliation: complete    Leatha Duque LPN

## 2017-11-29 NOTE — LETTER
[facility]  [address] [facility] [address]  Phone: [phone]    November 29, 2017        Asher Gonzales  [address] [facility]   [place] [address]        To whom it may concern:    RE: Asher FUCHS Christian    Patient was seen and treated today at our clinic. Please excuse his absence.    Please contact me for questions or concerns.      Sincerely,        MARIA INES ALFARO MD

## 2017-11-29 NOTE — PROGRESS NOTES
HPI:    Asher is a 42 year old male here to discuss:    Rash - his fiance's son was dx'd with scabies today. Asher has had rash on his torso and extremities for a few days. There is mild itch.  Evaluation and treatment:    See written instructions below.    Exam:    /80 (Cuff Size: Adult Regular)  Pulse 123  Wt 216 lb (98 kg)  SpO2 97%  BMI 29.29 kg/m2    Gen: Healthy appearing male in no acute distress  Skin: scattered tiny erythematous papules on the back, abd and arms. None in the web spaces of the hands. No obvious track marks.    Assessment and Plan - Decision Making    1. Rash  See below.  - permethrin (ELIMITE) 5 % cream; Apply cream from head to toe (except the face); leave on for 8-14 hours then wash off with water; reapply in 1 week if live mites appear.  Dispense: 60 g; Refill: 1      Written instructions given as follows:    Patient Instructions   1. Apply Permethrin on the entire body below the neck. Wash off 8-14 hours later. Repeat in one week.    2. Wash sheets and clothing at 140 F (60 C) and drying in a hot dryer. For items that cannot be machine washed, isolation in a plastic bag for at least 72 hours is sufficient.    3. Avoid skin to skin contact until the rash is gone.    4. Remember that the itching can continue to for two weeks after the second treatment.    5. If the symptoms persist over that time, please come back for re-evaluation. Sooner if worse.

## 2017-12-28 ENCOUNTER — TELEPHONE (OUTPATIENT)
Dept: BEHAVIORAL HEALTH | Facility: CLINIC | Age: 42
End: 2017-12-28

## 2017-12-28 NOTE — TELEPHONE ENCOUNTER
Behavioral Health Home Services  [facility] Clinic: [facility] Health Worker Note      Patient: Asher Gonzales  Date: December 28, 2017  Preferred Name: Asher    Previous PHQ-9:   PHQ-9 SCORE 10/9/2012 3/24/2017 4/5/2017   Total Score 0 - -   Total Score - 13 5     Previous FRANKLIN-7:   FRANKLIN-7 SCORE 3/24/2017 4/5/2017   Total Score 10 5     MAVERICK LEVEL:  MAVERICK Score (Last Two) 3/24/2017 4/5/2017   MAVERICK Raw Score 36 30   Activation Score 75.5 56   MAVERICK Level 4 3       Preferred Contact:  Need for : No  Preferred Contact: Cell    Type of Contact Today: Phone call (not reached/unavailable)      Data: (Subjective / Objective):  CHW attempted to reach patient for monthly [facility] outreach, but was unsuccessful. CHW left VM message explaining that a new [facility] SW has been hired and will be reaching out to pt by the end of January.   CHW encouraged pt to call [facility] voicemail if any resources or referrals are needed until new [facility] SW starts.         JAIRON Betancourt

## 2018-01-16 ENCOUNTER — OFFICE VISIT (OUTPATIENT)
Dept: FAMILY MEDICINE | Facility: CLINIC | Age: 43
End: 2018-01-16
Payer: COMMERCIAL

## 2018-01-16 ENCOUNTER — RADIANT APPOINTMENT (OUTPATIENT)
Dept: GENERAL RADIOLOGY | Facility: CLINIC | Age: 43
End: 2018-01-16
Attending: PHYSICIAN ASSISTANT
Payer: COMMERCIAL

## 2018-01-16 VITALS
DIASTOLIC BLOOD PRESSURE: 77 MMHG | SYSTOLIC BLOOD PRESSURE: 116 MMHG | BODY MASS INDEX: 30.65 KG/M2 | OXYGEN SATURATION: 98 % | WEIGHT: 226 LBS | TEMPERATURE: 97.6 F | HEART RATE: 84 BPM

## 2018-01-16 DIAGNOSIS — R07.81 RIB PAIN ON LEFT SIDE: Primary | ICD-10-CM

## 2018-01-16 DIAGNOSIS — R07.81 RIB PAIN ON LEFT SIDE: ICD-10-CM

## 2018-01-16 DIAGNOSIS — S23.41XA SPRAIN OF COSTAL CARTILAGE, INITIAL ENCOUNTER: ICD-10-CM

## 2018-01-16 PROCEDURE — 99213 OFFICE O/P EST LOW 20 MIN: CPT | Performed by: PHYSICIAN ASSISTANT

## 2018-01-16 PROCEDURE — 71101 X-RAY EXAM UNILAT RIBS/CHEST: CPT | Mod: LT

## 2018-01-16 RX ORDER — NAPROXEN 500 MG/1
500 TABLET ORAL 2 TIMES DAILY WITH MEALS
Qty: 30 TABLET | Refills: 0 | Status: SHIPPED | OUTPATIENT
Start: 2018-01-16 | End: 2018-09-17

## 2018-01-16 RX ORDER — CYCLOBENZAPRINE HCL 10 MG
10 TABLET ORAL
Qty: 14 TABLET | Refills: 1 | Status: SHIPPED | OUTPATIENT
Start: 2018-01-16 | End: 2018-09-17

## 2018-01-16 NOTE — MR AVS SNAPSHOT
After Visit Summary   1/16/2018    Asher Gonzales    MRN: [id number]           Patient Information     Date Of Birth          1975        Visit Information        Provider Department      1/16/2018 3:00 PM Carole Rosenbaum PA-C [facility]        Today's Diagnoses     Rib pain on left side    -  1    Sprain of costal cartilage, initial encounter           Follow-ups after your visit        Who to contact     If you have questions or need follow up information about today's clinic visit or your schedule please contact [facility] directly at [phone].  Normal or non-critical lab and imaging results will be communicated to you by Courseloadhart, letter or phone within 4 business days after the clinic has received the results. If you do not hear from us within 7 days, please contact the clinic through CodeEvalt or phone. If you have a critical or abnormal lab result, we will notify you by phone as soon as possible.  Submit refill requests through myseekit or call your pharmacy and they will forward the refill request to us. Please allow 3 business days for your refill to be completed.          Additional Information About Your Visit        MyChart Information     myseekit gives you secure access to your electronic health record. If you see a primary care provider, you can also send messages to your care team and make appointments. If you have questions, please call your primary care clinic.  If you do not have a primary care provider, please call [phone] and they will assist you.        Care EveryWhere ID     This is your Care EveryWhere ID. This could be used by other organizations to access your [facility] medical records  [id number]        Your Vitals Were     Pulse Temperature Pulse Oximetry BMI (Body Mass Index)          84 97.6  F (36.4  C) (Oral) 98% 30.65 kg/m2         Blood Pressure from Last 3 Encounters:   01/16/18 116/77   11/29/17 130/80   10/03/17 123/78     Weight from Last 3 Encounters:   01/16/18 226 lb (102.5 kg)   11/29/17 216 lb (98 kg)   10/03/17 217 lb (98.4 kg)                 Today's Medication Changes          These changes are accurate as of: 1/16/18  4:16 PM.  If you have any questions, ask your nurse or doctor.               Start taking these medicines.        Dose/Directions    cyclobenzaprine 10 MG tablet   Commonly known as:  FLEXERIL   Used for:  Rib pain on left side   Started by:  Carole Rosenbaum PA-C        Dose:  10 mg   Take 1 tablet (10 mg) by mouth nightly as needed for muscle spasms   Quantity:  14 tablet   Refills:  1       naproxen 500 MG tablet   Commonly known as:  NAPROSYN   Used for:  Rib pain on left side   Started by:  Carole Rosenbaum PA-C        Dose:  500 mg   Take 1 tablet (500 mg) by mouth 2 times daily (with meals)   Quantity:  30 tablet   Refills:  0            Where to get your medicines      These medications were sent to [address], [facility] 100  [id number] [place] [address]     Phone:  [phone]     cyclobenzaprine 10 MG tablet    naproxen 500 MG tablet                Primary Care Provider Office Phone # Fax #    Angus Astorga [phone]       [address] [facility] [address]        Equal Access to Services     ALLY [facility] AH: Hadii tommie ku hadasho Sosilvioali, waaxda luqadaha, qaybta kaalmada adeegyada, anthony oscar haylamont armas . So [facility] [phone].    ATENCIÓN: Si habla español, tiene a wright disposición servicios gratuitos de asistencia lingüística. Liza al [phone].    We comply with applicable federal civil rights laws and [place] laws. We do not discriminate on the basis of race, color, national origin, age, disability, sex, sexual orientation, or gender identity.            Thank you!     Thank you for choosing [facility]  for your care. Our goal is always to provide you with excellent  care. Hearing back from our patients is one way we can continue to improve our services. Please take a few minutes to complete the written survey that you may receive in the mail after your visit with us. Thank you!             Your Updated Medication List - Protect others around you: Learn how to safely use, store and throw away your medicines at www.disposemymeds.org.          This list is accurate as of: 1/16/18  4:16 PM.  Always use your most recent med list.                   Brand Name Dispense Instructions for use Diagnosis    allopurinol 100 MG tablet    ZYLOPRIM    30 tablet    Take 1 tablet (100 mg) by mouth daily    Gout of foot, unspecified cause, unspecified chronicity, unspecified laterality       carBAMazepine 200 MG tablet    TEGretol     Take 200 mg by mouth        cyclobenzaprine 10 MG tablet    FLEXERIL    14 tablet    Take 1 tablet (10 mg) by mouth nightly as needed for muscle spasms    Rib pain on left side       etodolac 500 MG tablet    LODINE    30 tablet    Take 1 tablet (500 mg) by mouth 2 times daily    Sacroiliac joint pain       naproxen 500 MG tablet    NAPROSYN    30 tablet    Take 1 tablet (500 mg) by mouth 2 times daily (with meals)    Rib pain on left side       PAXIL PO      Take by mouth daily        permethrin 5 % cream    ELIMITE    60 g    Apply cream from head to toe (except the face); leave on for 8-14 hours then wash off with water; reapply in 1 week if live mites appear.    Rash
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
## 2018-01-30 ENCOUNTER — TELEPHONE (OUTPATIENT)
Dept: BEHAVIORAL HEALTH | Facility: CLINIC | Age: 43
End: 2018-01-30

## 2018-01-30 NOTE — TELEPHONE ENCOUNTER
Behavioral Health Home Services  [facility] Clinic: [place]      Social Work Care Navigator Note      Patient: Asher Gonzales  Date: January 30, 2018  Preferred Name: Asher    Previous PHQ-9:   PHQ-9 SCORE 10/9/2012 3/24/2017 4/5/2017   Total Score 0 - -   Total Score - 13 5     Previous FRANKLIN-7:   FRANKLIN-7 SCORE 3/24/2017 4/5/2017   Total Score 10 5     MAVERICK LEVEL:  MAVERICK Score (Last Two) 3/24/2017 4/5/2017   MAVERICK Raw Score 36 30   Activation Score 75.5 56   MAVERICK Level 4 3       Preferred Contact:  Need for : No  Preferred Contact: Cell    Type of Contact Today: Phone call (patient / identified key support person reached)      Data: (subjective / Objective):  Recent ED/IP Admission or Discharge?   None    Patient Goals:  Goal Areas: Health;Mental Health;Employment / Volunteer;Financial and Social Service Benefits  Patient stated goals: Health: Goal is to follow up with neurology and their medical recommendations. Mental Health: Goal is to continue with therapy appointments, follow their recommendations and interventions provided. Pt also lists goal as wanting to get some medication for depression & anxiety. 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 Financial: Goal is to potentially receieve some benefits from the state.       [facility] Core Service Provided:  Health and Wellness Promotion    Assessment:     SW called Pt to introduce self and offer assistance. Pt stated that he was currently at work so could not talk to SW at the moment. Pt did state that he did not need anything at the present time. SW left contact info for Pt.     Plan: (Homework, other):  Patient was encouraged to continue to seek condition-related information and education.        Anneliese Marquez, Social Work Care Coordinator

## 2018-02-28 ENCOUNTER — TELEPHONE (OUTPATIENT)
Dept: BEHAVIORAL HEALTH | Facility: CLINIC | Age: 43
End: 2018-02-28

## 2018-02-28 NOTE — TELEPHONE ENCOUNTER
Behavioral Health Home Services  [facility] Clinic: [facility] Work Care Navigator Note      Patient: Asher Gonzales  Date: February 28, 2018  Preferred Name: Asher    Previous PHQ-9:   PHQ-9 SCORE 10/9/2012 3/24/2017 4/5/2017   Total Score 0 - -   Total Score - 13 5     Previous FRANKLIN-7:   FRANKLIN-7 SCORE 3/24/2017 4/5/2017   Total Score 10 5     MAVERICK LEVEL:  MAVERICK Score (Last Two) 3/24/2017 4/5/2017   AMVERICK Raw Score 36 30   Activation Score 75.5 56   MAVERICK Level 4 3       Preferred Contact:  Need for : No  Preferred Contact: Cell    Type of Contact Today: Phone call (not reached/unavailable)      Data: (subjective / Objective):  SW attempted to reach patient for monthly [facility] contact, but was unsuccessful.  Plan to attempt again in March.      HAMZAH Meng

## 2018-03-30 ENCOUNTER — TELEPHONE (OUTPATIENT)
Dept: BEHAVIORAL HEALTH | Facility: CLINIC | Age: 43
End: 2018-03-30

## 2018-03-30 NOTE — TELEPHONE ENCOUNTER
[facility]  [facility]: [facility] Work Care Navigator Note      Patient: Asher Gonzales  Date: March 30, 2018  Preferred Name: Asher    Previous PHQ-9:   PHQ-9 SCORE 10/9/2012 3/24/2017 4/5/2017   Total Score 0 - -   Total Score - 13 5     Previous FRANKLIN-7:   FRANKLIN-7 SCORE 3/24/2017 4/5/2017   Total Score 10 5     MAVERICK LEVEL:  MAVERICK Score (Last Two) 3/24/2017 4/5/2017   MAVERICK Raw Score 36 30   Activation Score 75.5 56   MAVERICK Level 4 3       Preferred Contact:  Need for : No  Preferred Contact: Cell    Type of Contact Today: Phone call (not reached/unavailable)      Data: (subjective / Objective):  SW attempted to reach patient for monthly [facility] contact, but was unsuccessful.  Plan to attempt again April.      HAMZAH Meng

## 2018-04-30 ENCOUNTER — TELEPHONE (OUTPATIENT)
Dept: BEHAVIORAL HEALTH | Facility: CLINIC | Age: 43
End: 2018-04-30

## 2018-04-30 NOTE — TELEPHONE ENCOUNTER
[facility]  [facility]: [facility] Work Care Navigator Note      Patient: Asher Gonzales  Date: April 30, 2018  Preferred Name: Asher    Previous PHQ-9:   PHQ-9 SCORE 10/9/2012 3/24/2017 4/5/2017   Total Score 0 - -   Total Score - 13 5     Previous FRANKLIN-7:   FRANKLIN-7 SCORE 3/24/2017 4/5/2017   Total Score 10 5     MAVERICK LEVEL:  MAVERICK Score (Last Two) 3/24/2017 4/5/2017   MAVERICK Raw Score 36 30   Activation Score 75.5 56   MAVERICK Level 4 3       Preferred Contact:  Need for : No  Preferred Contact: Cell    Type of Contact Today: Phone call (not reached/unavailable)      Data: (subjective / Objective):  Attempted to reach patient for [facility] contact, but was unsuccessful. Phone number listed was not working.  Plan to attempt again in May.      HAMZAH Meng

## 2018-05-31 ENCOUNTER — TELEPHONE (OUTPATIENT)
Dept: BEHAVIORAL HEALTH | Facility: CLINIC | Age: 43
End: 2018-05-31

## 2018-05-31 NOTE — TELEPHONE ENCOUNTER
[facility]  No Data Recorded      Social Work Care Navigator Note      Patient: Asher Gonzales  Date: May 31, 2018  Preferred Name: Asher    Previous PHQ-9:   PHQ-9 SCORE 10/9/2012 3/24/2017 4/5/2017   Total Score 0 - -   Total Score - 13 5     Previous FRANKLIN-7:   FRANKLIN-7 SCORE 3/24/2017 4/5/2017   Total Score 10 5     MAVERICK LEVEL:  MAVERICK Score (Last Two) 3/24/2017 4/5/2017   MAVERICK Raw Score 36 30   Activation Score 75.5 56   MAVERICK Level 4 3       Preferred Contact:  No Data Recorded    Type of Contact Today: Phone call (not reached/unavailable)      Data: (subjective / Objective):  SW attempted to reach patient for monthly [facility] contact, but was unsuccessful.  Plan to attempt again.      HAMZAH Meng

## 2018-06-29 ENCOUNTER — TELEPHONE (OUTPATIENT)
Dept: BEHAVIORAL HEALTH | Facility: CLINIC | Age: 43
End: 2018-06-29

## 2018-06-29 NOTE — TELEPHONE ENCOUNTER
[facility]  No Data Recorded      Social Work Care Navigator Note      Patient: Asher Gonzales  Date: June 29, 2018  Preferred Name: Asher    Previous PHQ-9:   PHQ-9 SCORE 10/9/2012 3/24/2017 4/5/2017   Total Score 0 - -   Total Score - 13 5     Previous FRANKLIN-7:   FRANKLIN-7 SCORE 3/24/2017 4/5/2017   Total Score 10 5     MAVERICK LEVEL:  MAVERICK Score (Last Two) 3/24/2017 4/5/2017   MAVERICK Raw Score 36 30   Activation Score 75.5 56   MAVERICK Level 4 3       Preferred Contact:  No Data Recorded    Type of Contact Today: Phone call (not reached/unavailable)      Data: (subjective / Objective):  SW attempted to reach patient for monthly [facility] contact, but was unsuccessful. There was no way of leaving a v/m message. Plan to attempt again in July.  HAMZAH Meng

## 2018-07-31 ENCOUNTER — TELEPHONE (OUTPATIENT)
Dept: BEHAVIORAL HEALTH | Facility: CLINIC | Age: 43
End: 2018-07-31

## 2018-07-31 NOTE — TELEPHONE ENCOUNTER
[facility]  No Data Recorded      Social Work Care Navigator Note      Patient: Asher Gonzales  Date: July 31, 2018  Preferred Name: Asher    Previous PHQ-9:   PHQ-9 SCORE 10/9/2012 3/24/2017 4/5/2017   Total Score 0 - -   Total Score - 13 5     Previous FRANKLIN-7:   FRANKLIN-7 SCORE 3/24/2017 4/5/2017   Total Score 10 5     MAVERICK LEVEL:  MAVERICK Score (Last Two) 3/24/2017 4/5/2017   MAVERICK Raw Score 36 30   Activation Score 75.5 56   MAVERICK Level 4 3       Preferred Contact:  No Data Recorded    Type of Contact Today: Phone call (not reached/unavailable)      Data: (subjective / Objective):  SW attempted to reach patient for monthly [facility] contact, but was unsuccessful. The phone number dialed rang but never switched over to a v/m so SW was not able to leave a message. Plan to attempt again in August.      NABIL MengCC

## 2018-08-31 ENCOUNTER — TELEPHONE (OUTPATIENT)
Dept: BEHAVIORAL HEALTH | Facility: CLINIC | Age: 43
End: 2018-08-31

## 2018-08-31 NOTE — TELEPHONE ENCOUNTER
[facility]  No Data Recorded      Social Work Care Navigator Note      Patient: Asher Gonzales  Date: August 31, 2018  Preferred Name: Asher    Previous PHQ-9:   PHQ-9 SCORE 10/9/2012 3/24/2017 4/5/2017   Total Score 0 - -   Total Score - 13 5     Previous FRANKLIN-7:   FRANKLIN-7 SCORE 3/24/2017 4/5/2017   Total Score 10 5     MAVERICK LEVEL:  MAVERICK Score (Last Two) 3/24/2017 4/5/2017   MAVERICK Raw Score 36 30   Activation Score 75.5 56   MAVERICK Level 4 3       Preferred Contact:  No Data Recorded    Type of Contact Today: Phone call (not reached/unavailable)      Data: (subjective / Objective):  Attempted to reach patient, but was unsuccessful.  Plan to attempt again in September.  HAMZAH Meng

## 2018-09-17 ENCOUNTER — RADIANT APPOINTMENT (OUTPATIENT)
Dept: GENERAL RADIOLOGY | Facility: CLINIC | Age: 43
End: 2018-09-17
Attending: INTERNAL MEDICINE
Payer: COMMERCIAL

## 2018-09-17 ENCOUNTER — OFFICE VISIT (OUTPATIENT)
Dept: INTERNAL MEDICINE | Facility: CLINIC | Age: 43
End: 2018-09-17
Payer: COMMERCIAL

## 2018-09-17 VITALS
BODY MASS INDEX: 30.75 KG/M2 | HEIGHT: 72 IN | SYSTOLIC BLOOD PRESSURE: 118 MMHG | HEART RATE: 94 BPM | WEIGHT: 227 LBS | DIASTOLIC BLOOD PRESSURE: 79 MMHG | OXYGEN SATURATION: 97 % | TEMPERATURE: 97.4 F

## 2018-09-17 DIAGNOSIS — M79.672 FOOT PAIN, LEFT: ICD-10-CM

## 2018-09-17 DIAGNOSIS — M79.672 FOOT PAIN, LEFT: Primary | ICD-10-CM

## 2018-09-17 LAB — URATE SERPL-MCNC: 7.8 MG/DL (ref 3.5–7.2)

## 2018-09-17 PROCEDURE — 84550 ASSAY OF BLOOD/URIC ACID: CPT | Performed by: INTERNAL MEDICINE

## 2018-09-17 PROCEDURE — 99213 OFFICE O/P EST LOW 20 MIN: CPT | Performed by: INTERNAL MEDICINE

## 2018-09-17 PROCEDURE — 36415 COLL VENOUS BLD VENIPUNCTURE: CPT | Performed by: INTERNAL MEDICINE

## 2018-09-17 PROCEDURE — 73630 X-RAY EXAM OF FOOT: CPT | Mod: LT

## 2018-09-17 RX ORDER — PREDNISONE 20 MG/1
20 TABLET ORAL DAILY
Qty: 5 TABLET | Refills: 0 | Status: SHIPPED | OUTPATIENT
Start: 2018-09-17 | End: 2018-10-05

## 2018-09-17 NOTE — MR AVS SNAPSHOT
After Visit Summary   9/17/2018    Asher Gonzales    MRN: [id number]           Patient Information     Date Of Birth          1975        Visit Information        Provider Department      9/17/2018 1:50 PM Karen Wiggins MD [facility]        Today's Diagnoses     Foot pain, left    -  1      Care Instructions    We will let you know your test results by Manifact.  Please take the antibiotics and prednisone and let us know if your foot is not better by the end of the week, sooner it is gets worse.  I do strongly advise you to check your kidney function due to your high doses of ibuprofen and aspirin recently. You have declined this test today and you are aware of the risks of not checking this.                Follow-ups after your visit        Future tests that were ordered for you today     Open Future Orders        Priority Expected Expires Ordered    XR Foot Left G/E 3 Views Routine 9/17/2018 9/17/2019 9/17/2018            Who to contact     If you have questions or need follow up information about today's clinic visit or your schedule please contact [facility] directly at [phone].  Normal or non-critical lab and imaging results will be communicated to you by Tsukulinkhart, letter or phone within 4 business days after the clinic has received the results. If you do not hear from us within 7 days, please contact the clinic through [employer] or phone. If you have a critical or abnormal lab result, we will notify you by phone as soon as possible.  Submit refill requests through Manifact or call your pharmacy and they will forward the refill request to us. Please allow 3 business days for your refill to be completed.          Additional Information About Your Visit        Tsukulinkhart Information     Manifact gives you secure access to your electronic health record. If you see a primary care provider, you can also send messages to your care team and make appointments.  If you have questions, please call your primary care clinic.  If you do not have a primary care provider, please call [phone] and they will assist you.        Care EveryWhere ID     This is your Care EveryWhere ID. This could be used by other organizations to access your [facility] medical records  [id number]        Your Vitals Were     Pulse Temperature Height Pulse Oximetry BMI (Body Mass Index)       94 97.4  F (36.3  C) (Oral) 6' (1.829 m) 97% 30.79 kg/m2        Blood Pressure from Last 3 Encounters:   09/17/18 118/79   01/16/18 116/77   11/29/17 130/80    Weight from Last 3 Encounters:   09/17/18 227 lb (103 kg)   01/16/18 226 lb (102.5 kg)   11/29/17 216 lb (98 kg)              We Performed the Following     Uric acid          Today's Medication Changes          These changes are accurate as of 9/17/18  2:49 PM.  If you have any questions, ask your nurse or doctor.               Start taking these medicines.        Dose/Directions    predniSONE 20 MG tablet   Commonly known as:  DELTASONE   Used for:  Foot pain, left   Started by:  Karen Wiggins MD        Dose:  20 mg   Take 1 tablet (20 mg) by mouth daily   Quantity:  5 tablet   Refills:  0            Where to get your medicines      These medications were sent to [facility] [address], [address]  [address] [place] [address]     Phone:  [phone]     predniSONE 20 MG tablet                Primary Care Provider Office Phone # Fax #    Angus Astorga [phone]       [address] [place] [address]        Equal Access to Services     [facility] AH: Hadmelva Reeder, rigoberto campoverde, qapadmata jenniferalanthony [place]. So [facility] [place] [phone].    ATENCIÓN: Si habla español, tiene a wright disposición servicios gratuitos de asistencia lingüística. Llame al [phone].    We comply with applicable federal civil rights  laws and [place] laws. We do not discriminate on the basis of race, color, national origin, age, disability, sex, sexual orientation, or gender identity.            Thank you!     Thank you for [facility]. Our goal is always to provide you with excellent care. Hearing back from our patients is one way we can continue to improve our services. Please take a few minutes to complete the written survey that you may receive in the mail after your visit with us. Thank you!             Your Updated Medication List - Protect others around you: Learn how to safely use, store and throw away your medicines at www.disposemymeds.org.          This list is accurate as of 9/17/18  2:49 PM.  Always use your most recent med list.                   Brand Name Dispense Instructions for use Diagnosis    predniSONE 20 MG tablet    DELTASONE    5 tablet    Take 1 tablet (20 mg) by mouth daily    Foot pain, left
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
## 2018-09-17 NOTE — PATIENT INSTRUCTIONS
[facility]  Please take the antibiotics and prednisone and let us know if your foot is not better by the end of the week, sooner it is gets worse.  I do strongly advise you to check your kidney function due to your high doses of ibuprofen and aspirin recently. You have declined this test today and you are aware of the risks of not checking this.

## 2018-09-26 ENCOUNTER — TELEPHONE (OUTPATIENT)
Dept: INTERNAL MEDICINE | Facility: CLINIC | Age: 43
End: 2018-09-26

## 2018-09-26 NOTE — TELEPHONE ENCOUNTER
Needs to be seen  He refused labwork last week to check his kidney function so we are very limited on medications we can give him  He needs to be seen      Lashay Dahl MD

## 2018-09-26 NOTE — TELEPHONE ENCOUNTER
Patient saw Dr. Karen Wiggins last week for foot pain. Given prednisone and told to contact clinic if not better this week.  Patient still having foot pain and has history of gout.     Uric acid level came back abnormal. Dr. Karen Wiggins has not yet addressed this result.  Routing to provider to advise.  Sandra Gross BSN RN    Uric Acid   Date Value Ref Range Status   09/17/2018 7.8 (H) 3.5 - 7.2 mg/dL Final

## 2018-09-26 NOTE — TELEPHONE ENCOUNTER
Called patient and gave providers message/ instructions.  Patient states he understands this.   Since no openings in clinic today, patient will call for same-day appointment tomorrow morning.     ILIANA ToledoN RN

## 2018-09-26 NOTE — TELEPHONE ENCOUNTER
Patient states the medication he got for his foot does not work and wonders if he can get something else.    Thank you.

## 2018-09-28 ENCOUNTER — TELEPHONE (OUTPATIENT)
Dept: BEHAVIORAL HEALTH | Facility: CLINIC | Age: 43
End: 2018-09-28

## 2018-09-28 NOTE — TELEPHONE ENCOUNTER
[facility]  No Data Recorded      Social Work Care Navigator Note      Patient: Asher Gonzales  Date: September 28, 2018  Preferred Name: Asher    Previous PHQ-9:   PHQ-9 SCORE 10/9/2012 3/24/2017 4/5/2017   Total Score 0 - -   Total Score - 13 5     Previous FRANKLIN-7:   FRANKLIN-7 SCORE 3/24/2017 4/5/2017   Total Score 10 5     MAVERICK LEVEL:  MAVERICK Score (Last Two) 3/24/2017 4/5/2017   MAVERICK Raw Score 36 30   Activation Score 75.5 56   MAVERICK Level 4 3       Preferred Contact:  No Data Recorded    Type of Contact Today: Phone call (not reached/unavailable)      Data: (subjective / Objective):  Attempted to reach patient, but was unsuccessful.  Plan to attempt again.      HAMZAH Meng

## 2018-10-04 ENCOUNTER — OFFICE VISIT (OUTPATIENT)
Dept: FAMILY MEDICINE | Facility: CLINIC | Age: 43
End: 2018-10-04
Payer: COMMERCIAL

## 2018-10-04 VITALS
RESPIRATION RATE: 17 BRPM | BODY MASS INDEX: 31.46 KG/M2 | WEIGHT: 232 LBS | DIASTOLIC BLOOD PRESSURE: 82 MMHG | TEMPERATURE: 98.1 F | SYSTOLIC BLOOD PRESSURE: 131 MMHG | HEART RATE: 99 BPM | OXYGEN SATURATION: 96 %

## 2018-10-04 DIAGNOSIS — M10.9 GOUT OF FOOT, UNSPECIFIED CAUSE, UNSPECIFIED CHRONICITY, UNSPECIFIED LATERALITY: ICD-10-CM

## 2018-10-04 DIAGNOSIS — M10.9 ACUTE GOUTY ARTHRITIS: ICD-10-CM

## 2018-10-04 DIAGNOSIS — M79.672 LEFT FOOT PAIN: Primary | ICD-10-CM

## 2018-10-04 LAB
ANION GAP SERPL CALCULATED.3IONS-SCNC: 10 MMOL/L (ref 3–14)
BUN SERPL-MCNC: 14 MG/DL (ref 7–30)
CALCIUM SERPL-MCNC: 9 MG/DL (ref 8.5–10.1)
CHLORIDE SERPL-SCNC: 101 MMOL/L (ref 94–109)
CO2 SERPL-SCNC: 29 MMOL/L (ref 20–32)
CREAT SERPL-MCNC: 0.75 MG/DL (ref 0.66–1.25)
GFR SERPL CREATININE-BSD FRML MDRD: >90 ML/MIN/1.7M2
GLUCOSE SERPL-MCNC: 91 MG/DL (ref 70–99)
POTASSIUM SERPL-SCNC: 3.7 MMOL/L (ref 3.4–5.3)
SODIUM SERPL-SCNC: 140 MMOL/L (ref 133–144)

## 2018-10-04 PROCEDURE — 99213 OFFICE O/P EST LOW 20 MIN: CPT | Performed by: FAMILY MEDICINE

## 2018-10-04 PROCEDURE — 80048 BASIC METABOLIC PNL TOTAL CA: CPT | Performed by: FAMILY MEDICINE

## 2018-10-04 PROCEDURE — 36415 COLL VENOUS BLD VENIPUNCTURE: CPT | Performed by: FAMILY MEDICINE

## 2018-10-04 RX ORDER — COLCHICINE 0.6 MG/1
TABLET ORAL
Qty: 40 TABLET | Refills: 2 | Status: SHIPPED | OUTPATIENT
Start: 2018-10-04 | End: 2018-11-19

## 2018-10-04 RX ORDER — ALLOPURINOL 100 MG/1
200 TABLET ORAL DAILY
Qty: 60 TABLET | Refills: 5 | Status: SHIPPED | OUTPATIENT
Start: 2018-10-04 | End: 2018-12-31

## 2018-10-04 RX ORDER — ALLOPURINOL 100 MG/1
100 TABLET ORAL DAILY
Qty: 30 TABLET | Refills: 0 | Status: SHIPPED | OUTPATIENT
Start: 2018-10-04 | End: 2018-12-31

## 2018-10-04 ASSESSMENT — PAIN SCALES - GENERAL: PAINLEVEL: NO PAIN (0)

## 2018-10-04 NOTE — LETTER
Oct 4, 2018            Asher Gonzales              Was seen today for a medical condition.                Geraldo Elias MD on 10/4/2018 at 11:27 AM

## 2018-10-04 NOTE — MR AVS SNAPSHOT
After Visit Summary   10/4/2018    Asher Gonzales    MRN: [id number]           Patient Information     Date Of Birth          1975        Visit Information        Provider Department      10/4/2018 11:00 AM Geraldo Elais MD [facility]        Today's Diagnoses     Left foot pain    -  1    Gout of foot, unspecified cause, unspecified chronicity, unspecified laterality        Acute gouty arthritis          Care Instructions      Take prescribed medication as directed.            Follow-ups after your visit        Who to contact     If you have questions or need follow up information about today's clinic visit or your schedule please contact [facility] directly at [phone].  Normal or non-critical lab and imaging results will be communicated to you by MyChart, letter or phone within 4 business days after the clinic has received the results. If you do not hear from us within 7 days, please contact the clinic through [employer] or phone. If you have a critical or abnormal lab result, we will notify you by phone as soon as possible.  Submit refill requests through Sqrrl or call your pharmacy and they will forward the refill request to us. Please allow 3 business days for your refill to be completed.          Additional Information About Your Visit        MyChart Information     Sqrrl gives you secure access to your electronic health record. If you see a primary care provider, you can also send messages to your care team and make appointments. If you have questions, please call your primary care clinic.  If you do not have a primary care provider, please call [phone] and they will assist you.        Care EveryWhere ID     This is your Care EveryWhere ID. This could be used by other organizations to access your [facility] medical records  [id number]        Your Vitals Were     Pulse Temperature Respirations Pulse Oximetry BMI (Body Mass Index)       99 98.1  F  (36.7  C) (Oral) 17 96% 31.46 kg/m2        Blood Pressure from Last 3 Encounters:   10/04/18 131/82   09/17/18 118/79   01/16/18 116/77    Weight from Last 3 Encounters:   10/04/18 232 lb (105.2 kg)   09/17/18 227 lb (103 kg)   01/16/18 226 lb (102.5 kg)              We Performed the Following     Basic metabolic panel  (Ca, Cl, CO2, Creat, Gluc, K, Na, BUN)          Today's Medication Changes          These changes are accurate as of 10/4/18 11:25 AM.  If you have any questions, ask your nurse or doctor.               Start taking these medicines.        Dose/Directions    * allopurinol 100 MG tablet   Commonly known as:  ZYLOPRIM   Used for:  Gout of foot, unspecified cause, unspecified chronicity, unspecified laterality   Started by:  Geraldo Elias MD        Dose:  100 mg   Take 1 tablet (100 mg) by mouth daily   Quantity:  30 tablet   Refills:  0       * allopurinol 100 MG tablet   Commonly known as:  ZYLOPRIM   Used for:  Gout of foot, unspecified cause, unspecified chronicity, unspecified laterality   Started by:  Geraldo Elias MD        Dose:  200 mg   Take 2 tablets (200 mg) by mouth daily   Quantity:  60 tablet   Refills:  5       colchicine 0.6 MG tablet   Commonly known as:  COLCRYS   Used for:  Acute gouty arthritis   Started by:  Geraldo Elias MD        One daily or twice daily if needed for gout flare up.   Quantity:  40 tablet   Refills:  2       * Notice:  This list has 2 medication(s) that are the same as other medications prescribed for you. Read the directions carefully, and ask your doctor or other care provider to review them with you.         Where to get your medicines      These medications were sent to [facility] [address] [facility] [address]  [address] [address] [address]     Phone:  [phone]     allopurinol 100 MG tablet    colchicine 0.6 MG tablet         Some of these will need a paper prescription and others can be bought  over the counter.  Ask your nurse if you have questions.     Bring a paper prescription for each of these medications     allopurinol 100 MG tablet                Primary Care Provider Office Phone # Fax #    Angus Astorga [phone] [address] [facility] [address]        Equal Access to Services     CAROLE YE : Hadii aad ku hadasho Soomaali, waaxda luqadaha, qaybta kaalmada adeegyada, waxay denyin hayaan adechris moojorje isabel. So [facility] [phone].    ATENCIÓN: Si habla español, tiene a wright disposición servicios gratuitos de asistencia lingüística. Llame al [phone].    We comply with applicable federal civil rights laws and [place] laws. We do not discriminate on the basis of race, color, national origin, age, disability, sex, sexual orientation, or gender identity.            Thank you!     Thank you for choosing [facility]  for your care. Our goal is always to provide you with excellent care. Hearing back from our patients is one way we can continue to improve our services. Please take a few minutes to complete the written survey that you may receive in the mail after your visit with us. Thank you!             Your Updated Medication List - Protect others around you: Learn how to safely use, store and throw away your medicines at www.disposemymeds.org.          This list is accurate as of 10/4/18 11:25 AM.  Always use your most recent med list.                   Brand Name Dispense Instructions for use Diagnosis    * allopurinol 100 MG tablet    ZYLOPRIM    30 tablet    Take 1 tablet (100 mg) by mouth daily    Gout of foot, unspecified cause, unspecified chronicity, unspecified laterality       * allopurinol 100 MG tablet    ZYLOPRIM    60 tablet    Take 2 tablets (200 mg) by mouth daily    Gout of foot, unspecified cause, unspecified chronicity, unspecified laterality       colchicine 0.6 MG tablet    COLCRYS    40 tablet    One daily or twice daily if  needed for gout flare up.    Acute gouty arthritis       predniSONE 20 MG tablet    DELTASONE    5 tablet    Take 1 tablet (20 mg) by mouth daily    Foot pain, left       * Notice:  This list has 2 medication(s) that are the same as other medications prescribed for you. Read the directions carefully, and ask your doctor or other care provider to review them with you.

## 2018-10-04 NOTE — NURSING NOTE
Chief Complaint   Patient presents with     Musculoskeletal Problem     left foot - side of foot pain - tx with prednisone - hx of gout-        Initial /82  Pulse 99  Temp 98.1  F (36.7  C) (Oral)  Resp 17  Wt 232 lb (105.2 kg)  SpO2 96%  BMI 31.46 kg/m2 Estimated body mass index is 31.46 kg/(m^2) as calculated from the following:    Height as of 9/17/18: 6' (1.829 m).    Weight as of this encounter: 232 lb (105.2 kg).  Medication Reconciliation: complete  Paloma Nagy M.A.
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
## 2018-10-29 ENCOUNTER — TELEPHONE (OUTPATIENT)
Dept: BEHAVIORAL HEALTH | Facility: CLINIC | Age: 43
End: 2018-10-29

## 2018-10-29 NOTE — TELEPHONE ENCOUNTER
[facility]  No Data Recorded      Social Work Care Navigator Note      Patient: Asher Gonzales  Date: October 29, 2018  Preferred Name: Asher    Previous PHQ-9:   PHQ-9 SCORE 10/9/2012 3/24/2017 4/5/2017   Total Score 0 - -   Total Score - 13 5     Previous FRANKLIN-7:   FRANKLIN-7 SCORE 3/24/2017 4/5/2017   Total Score 10 5     MAVERICK LEVEL:  MAVERICK Score (Last Two) 3/24/2017 4/5/2017   MAVERICK Raw Score 36 30   Activation Score 75.5 56   MAVERICK Level 4 3       Preferred Contact:  No Data Recorded    Type of Contact Today: Phone call (not reached/unavailable)      Data: (subjective / Objective):  Attempted to reach patient, but was unsuccessful.  NABIL sent a detailed letter in the mail disclosing that [facility]'s last day at [place] is October 30th. SW encouraged patient to call 's direct line if any assistance is needed.     Anneliese Marqeuz

## 2018-11-15 ENCOUNTER — TELEPHONE (OUTPATIENT)
Dept: BEHAVIORAL HEALTH | Facility: CLINIC | Age: 43
End: 2018-11-15

## 2018-11-15 NOTE — TELEPHONE ENCOUNTER
.  [facility]  No Data Recorded      Community Health Worker Note      Patient: Asher Gonzales  Date: November 15, 2018  Preferred Name: Asher    Previous PHQ-9:   PHQ-9 SCORE 10/9/2012 3/24/2017 4/5/2017   Total Score 0 - -   Total Score - 13 5     Previous FRANKLIN-7:   FRANKLIN-7 SCORE 3/24/2017 4/5/2017   Total Score 10 5     MAVERICK LEVEL:  MAVERICK Score (Last Two) 3/24/2017 4/5/2017   MAVERICK Raw Score 36 30   Activation Score 75.5 56   MAVERICK Level 4 3       Preferred Contact:  No Data Recorded    Type of Contact Today: Phone call (not reached/unavailable)      Data: (Subjective / Objective):  CHW attempted to reach patient for monthly [facility] contact, but was unsuccessful. CHW left a VM requesting a call back. CHW plan to attempt again.   Cand[facility] Worker  [facility]

## 2018-11-16 NOTE — PROGRESS NOTES
SUBJECTIVE:   Asher Gonzales is a 43 year old male who presents to clinic today for the following health issues:    Musculoskeletal problem/pain      Duration: 2 months    Description  Location: left foot (outer)    Intensity:  moderate    Accompanying signs and symptoms: swelling and sensitive to the touch    History  Previous similar problem: YES- ongoing  Previous evaluation:  x-ray and OV 09/17/2018    Precipitating or alleviating factors:  Trauma or overuse: no   Aggravating factors include: overuse    Therapies tried and outcome: rest/inactivity, heat, ice, massage, acetaminophen and Ibuprofen and allopurinol     Patient presents with left foot pain that has been going on for the past 2 months or so.  No warmth or redness, has some lateral foot swelling, pain is worse when he starts walking, no notable injury, Uric acid was found to be elevated.  He's taking ibuprofen which hasn't helped much.  XR was done back in September and was normal.  He has been taking allopurinol 100 then increased to 200mg as well as colchicine.    Problem list and histories reviewed & adjusted, as indicated.  Additional history: as documented    BP Readings from Last 3 Encounters:   11/19/18 116/76   10/04/18 131/82   09/17/18 118/79    Wt Readings from Last 3 Encounters:   11/19/18 235 lb 3.2 oz (106.7 kg)   10/04/18 232 lb (105.2 kg)   09/17/18 227 lb (103 kg)        Reviewed and updated as needed this visit by clinical staff  Tobacco  Allergies  Meds  Problems       Reviewed and updated as needed this visit by Provider  Allergies  Meds  Problems         ROS:  Constitutional, HEENT, cardiovascular, pulmonary, gi and gu systems are negative, except as otherwise noted.    OBJECTIVE:     /76  Pulse 88  Temp 97.2  F (36.2  C) (Oral)  Resp 20  Wt 235 lb 3.2 oz (106.7 kg)  SpO2 97%  BMI 31.9 kg/m2  Body mass index is 31.9 kg/(m^2).  GENERAL: healthy, alert and no distress  EYES: Eyes grossly normal to inspection,  PERRL and conjunctivae and sclerae normal  RESP: lungs clear to auscultation - no rales, rhonchi or wheezes  CV: regular rate and rhythm, normal S1 S2, no S3 or S4, no murmur, click or rub, no peripheral edema and peripheral pulses strong  MS: tenderness lateral aspect of left foot with slight edema, no redness or warmth  SKIN: no suspicious lesions or rashes  NEURO: Normal strength and tone, mentation intact and speech normal  PSYCH: mentation appears normal, affect normal/bright    ASSESSMENT/PLAN:     1. Acute gout of left foot, unspecified cause  Will give extended course of prednisone, check uric acid, give NSAIDS, will refer to podiatry for further assessment if no improvement with current treatment  - predniSONE (DELTASONE) 20 MG tablet; Take 3 tabs (60 mg) by mouth daily x 3 days, 2 tabs (40 mg) daily x 3 days, 1 tab (20 mg) daily x 3 days, then 1/2 tab (10 mg) x 3 days.  Dispense: 20 tablet; Refill: 0  - Uric acid  - ORTHO  REFERRAL  - naproxen (NAPROSYN) 375 MG tablet; Take 1 tablet (375 mg) by mouth 2 times daily (with meals)  Dispense: 60 tablet; Refill: 1    2. Acute gouty arthritis  - colchicine (COLCRYS) 0.6 MG tablet; One daily or twice daily if needed for gout flare up.  Dispense: 40 tablet; Refill: 2    Follow up if symptoms worsen or fail to improve.     Toby Purcell MD  [facility]

## 2018-11-19 ENCOUNTER — OFFICE VISIT (OUTPATIENT)
Dept: FAMILY MEDICINE | Facility: CLINIC | Age: 43
End: 2018-11-19
Payer: COMMERCIAL

## 2018-11-19 VITALS
RESPIRATION RATE: 20 BRPM | SYSTOLIC BLOOD PRESSURE: 116 MMHG | OXYGEN SATURATION: 97 % | DIASTOLIC BLOOD PRESSURE: 76 MMHG | TEMPERATURE: 97.2 F | WEIGHT: 235.2 LBS | HEART RATE: 88 BPM | BODY MASS INDEX: 31.9 KG/M2

## 2018-11-19 DIAGNOSIS — M10.9 ACUTE GOUTY ARTHRITIS: ICD-10-CM

## 2018-11-19 DIAGNOSIS — M10.9 ACUTE GOUT OF LEFT FOOT, UNSPECIFIED CAUSE: Primary | ICD-10-CM

## 2018-11-19 PROCEDURE — 84550 ASSAY OF BLOOD/URIC ACID: CPT | Performed by: FAMILY MEDICINE

## 2018-11-19 PROCEDURE — 99214 OFFICE O/P EST MOD 30 MIN: CPT | Performed by: FAMILY MEDICINE

## 2018-11-19 PROCEDURE — 36415 COLL VENOUS BLD VENIPUNCTURE: CPT | Performed by: FAMILY MEDICINE

## 2018-11-19 RX ORDER — COLCHICINE 0.6 MG/1
TABLET ORAL
Qty: 30 TABLET | Refills: 0 | Status: SHIPPED | OUTPATIENT
Start: 2018-11-19 | End: 2018-12-31

## 2018-11-19 RX ORDER — COLCHICINE 0.6 MG/1
TABLET ORAL
Qty: 40 TABLET | Refills: 2 | Status: SHIPPED | OUTPATIENT
Start: 2018-11-19 | End: 2018-11-19

## 2018-11-19 RX ORDER — PREDNISONE 20 MG/1
TABLET ORAL
Qty: 20 TABLET | Refills: 0 | Status: SHIPPED | OUTPATIENT
Start: 2018-11-19

## 2018-11-19 NOTE — PATIENT INSTRUCTIONS
Gout Diet  Gout is a painful condition caused by an excess of uric acid, a waste product made by the body. Uric acid forms crystals that collect in the joints. The immune response to these crystals brings on symptoms of joint pain and swelling. This is called a gout attack. Often, medications and diet changes are combined to manage gout. Below are some guidelines for changing your diet to help you manage gout and prevent attacks. Your healthcare provider will help you determine the best eating plan for you.  Eating to manage gout  Weight loss for those who are overweight may help reduce gout attacks.  Eat less of these foods  Eating too many foods containing purines may raise the levels of uric acid in your body. This raises your risk for a gout attack. Try to limit these foods and drinks:    Alcohol, such as beer and red wine. You may be told to avoid alcohol completely.    Soft drinks that contain sugar or high fructose corn syrup    Certain fish, including anchovies, sardines, fish eggs, and herring    Shellfish    Certain meats, such as red meat, hot dogs, luncheon meats, and turkey    Organ meats, such as liver, kidneys, and sweetbreads    Legumes, such as dried beans and peas    Other high fat foods such as gravy, whole milk, and high fat cheeses    Vegetables such as asparagus, cauliflower, spinach, and mushrooms used to be thought to contribute to an increased risk for a gout attack, but recent studies show that high purine vegetables don't increase the risk for a gout attack.  Eat more of these foods  Other foods may be helpful for people with gout. Add some of these foods to your diet:    Cherries contain chemicals that may lower uric acid.    Omega fatty acids. These are found in some fatty fish such as salmon, certain oils (flax, olive, or nut), and nuts themselves. Omega fatty acids may help prevent inflammation due to gout.    Dairy products that are low-fat or fat-free, such as cheese and  yogurt    Complex carbohydrate foods, including whole grains, brown rice, oats, and beans    Coffee, in moderation    Water, approximately 64 ounces per day  Follow-up care  Follow up with your healthcare provider as advised.  When to seek medical advice  Call your healthcare provider right away if any of these occur:    Return of gout symptoms, usually at night:    Severe pain, swelling, and heat in a joint, especially the base of the big toe    Affected joint is hard to move    Skin of the affected joint is purple or red    Fever of 100.4 F (38 C) or higher    Pain that doesn't get better even with prescribed medicine   Date Last Reviewed: 6/1/2018 2000-2018 [employer]. [address] [place] [address], [place] [address]. All rights reserved. This information is not intended as a substitute for professional medical care. Always follow your healthcare professional's instructions.        Eating to Prevent Gout  Gout is a painful form of arthritis caused by an excess of uric acid. This is a waste product made by the body. It builds up in the body and forms crystals that collect in the joints, causing a gout attack. Alcohol and certain foods can trigger a gout attack. Below are some guidelines for changing your diet to help you manage gout. Your healthcare provider can work with you to determine the best eating plan for you. Know that diet is only one part of managing gout. Take your medicines as prescribed and follow the other guidelines your healthcare provider has given you.  Foods to limit  Eating too many foods containing purines may increase the levels of uric acid in your body and increase your risk for a gout attack. It may be best to limit these high-purine foods:    Alcohol (beer and red wine). You may be told to avoid alcohol completely.    Certain fish (anchovies, sardines, fish roes, herring, tuna, mussels, codfish, scallops, trout, and palomo)    Certain meats (red meat, processed meat, kim,  turkey, wild game, and goose)    Sauces and gravies made with meat    Organ meats (such as liver, kidneys, sweetbreads, and tripe)    Legumes (such as dried beans and peas)    Mushrooms, spinach, asparagus, and cauliflower    Yeast and yeast extract supplements  Foods to try  Some foods may be helpful for people with gout. You may want to try adding some of the following foods to your diet:    Dark berries. These include blueberries, blackberries, and cherries. These berries contain chemicals that may lower uric acid.    Tofu. Tofu, which is made from soy, is a good source of protein. Studies have shown that it may be a better choice than meat for people with gout.    Omega fatty acids. These acids are found in fatty fish (such as salmon), certain oils (such as flax, olive, or nut oils), or nuts. They may help prevent inflammation due to gout.  The following guidelines are recommended by the American Medical Association for people with gout. Your diet should be:    High in fiber, whole grains, fruits, and vegetables.    Low in protein (15% of calories should come from protein. Choose lean sources, such as soy, lean meats, and poultry).    Low in fat (no more than 30% of calories should come from fat, with only 10% coming from animal, or saturated, fat).   Date Last Reviewed: 11/1/2017 2000-2018 [employer]. [address] [place] [address]. All rights reserved. This information is not intended as a substitute for professional medical care. Always follow your healthcare professional's instructions.      [facility]    If you have any questions regarding to your visit please contact your care team:       Team Purple:   Clinic Hours Telephone Number   Dr. Yue Purcell   7am-7pm  Monday - Thursday   7am-5pm  Fridays  [phone]  (Appointment scheduling available 24/7)   [facility]gent [facility] - [place] [facility] [place] - 11am-9pm  Monday-Friday Saturday-Sunday- 9am-5pm   [place] - 5pm-9pm Monday-Friday Saturday-Sunday- 9am-5pm  [phone] - Shari Flynn  [phone] - [place]       What options do I have for a visit other than an office visit? We offer electronic visits (e-visits) and telephone visits, when medically appropriate.  Please check with your medical insurance to see if these types of visits are covered, as you will be responsible for any charges that are not paid by your insurance.      You can use [employer] (secure electronic communication) to access to your chart, send your primary care provider a message, or make an appointment. Ask a team member how to get started.     For a price quote for your services, please call our Consumer Price Line at [phone] or our Imaging Cost estimation line at [phone] (for imaging tests).

## 2018-11-19 NOTE — LETTER
[facility]  [address] [place] [address]    November 26, 2018    Asher Gonzales  [address]   [place] [address]          Dear Asher,    Your uric acid is the same as it was 2 months ago.  Uric acid tends to precipitate into joints at a level of about 6.5.  Please continue taking your allopurinol and try your best to follow a gout diet in order to lower your uric acid.  Please let me know if you have any questions.     Enclosed is a copy of your results.     Results for orders placed or performed in visit on 11/19/18   Uric acid   Result Value Ref Range    Uric Acid 7.8 (H) 3.5 - 7.2 mg/dL       If you have any questions or concerns, please call myself or my nurse at [phone].      Sincerely,        Toby Marquez MD/stormy

## 2018-11-19 NOTE — MR AVS SNAPSHOT
After Visit Summary   11/19/2018    Asher Gonzales    MRN: [id number]           Patient Information     Date Of Birth          1975        Visit Information        Provider Department      11/19/2018 4:20 PM Toby Marquez MD [facility]        Today's Diagnoses     Acute gout of left foot, unspecified cause    -  1    Acute gouty arthritis          Care Instructions      Gout Diet  Gout is a painful condition caused by an excess of uric acid, a waste product made by the body. Uric acid forms crystals that collect in the joints. The immune response to these crystals brings on symptoms of joint pain and swelling. This is called a gout attack. Often, medications and diet changes are combined to manage gout. Below are some guidelines for changing your diet to help you manage gout and prevent attacks. Your healthcare provider will help you determine the best eating plan for you.  Eating to manage gout  Weight loss for those who are overweight may help reduce gout attacks.  Eat less of these foods  Eating too many foods containing purines may raise the levels of uric acid in your body. This raises your risk for a gout attack. Try to limit these foods and drinks:    Alcohol, such as beer and red wine. You may be told to avoid alcohol completely.    Soft drinks that contain sugar or high fructose corn syrup    Certain fish, including anchovies, sardines, fish eggs, and herring    Shellfish    Certain meats, such as red meat, hot dogs, luncheon meats, and turkey    Organ meats, such as liver, kidneys, and sweetbreads    Legumes, such as dried beans and peas    Other high fat foods such as gravy, whole milk, and high fat cheeses    Vegetables such as asparagus, cauliflower, spinach, and mushrooms used to be thought to contribute to an increased risk for a gout attack, but recent studies show that high purine vegetables don't increase the risk for a gout attack.  Eat more  of these foods  Other foods may be helpful for people with gout. Add some of these foods to your diet:    Cherries contain chemicals that may lower uric acid.    Omega fatty acids. These are found in some fatty fish such as salmon, certain oils (flax, olive, or nut), and nuts themselves. Omega fatty acids may help prevent inflammation due to gout.    Dairy products that are low-fat or fat-free, such as cheese and yogurt    Complex carbohydrate foods, including whole grains, brown rice, oats, and beans    Coffee, in moderation    Water, approximately 64 ounces per day  Follow-up care  Follow up with your healthcare provider as advised.  When to seek medical advice  Call your healthcare provider right away if any of these occur:    Return of gout symptoms, usually at night:    Severe pain, swelling, and heat in a joint, especially the base of the big toe    Affected joint is hard to move    Skin of the affected joint is purple or red    Fever of 100.4 F (38 C) or higher    Pain that doesn't get better even with prescribed medicine   Date Last Reviewed: 6/1/2018 2000-2018 [employer]. [address] [place] [address]. All rights reserved. This information is not intended as a substitute for professional medical care. Always follow your healthcare professional's instructions.        Eating to Prevent Gout  Gout is a painful form of arthritis caused by an excess of uric acid. This is a waste product made by the body. It builds up in the body and forms crystals that collect in the joints, causing a gout attack. Alcohol and certain foods can trigger a gout attack. Below are some guidelines for changing your diet to help you manage gout. Your healthcare provider can work with you to determine the best eating plan for you. Know that diet is only one part of managing gout. Take your medicines as prescribed and follow the other guidelines your healthcare provider has given you.  Foods to limit  Eating  too many foods containing purines may increase the levels of uric acid in your body and increase your risk for a gout attack. It may be best to limit these high-purine foods:    Alcohol (beer and red wine). You may be told to avoid alcohol completely.    Certain fish (anchovies, sardines, fish roes, herring, tuna, mussels, codfish, scallops, trout, and palomo)    Certain meats (red meat, processed meat, kim, turkey, wild game, and goose)    Sauces and gravies made with meat    Organ meats (such as liver, kidneys, sweetbreads, and tripe)    Legumes (such as dried beans and peas)    Mushrooms, spinach, asparagus, and cauliflower    Yeast and yeast extract supplements  Foods to try  Some foods may be helpful for people with gout. You may want to try adding some of the following foods to your diet:    Dark berries. These include blueberries, blackberries, and cherries. These berries contain chemicals that may lower uric acid.    Tofu. Tofu, which is made from soy, is a good source of protein. Studies have shown that it may be a better choice than meat for people with gout.    Omega fatty acids. These acids are found in fatty fish (such as salmon), certain oils (such as flax, olive, or nut oils), or nuts. They may help prevent inflammation due to gout.  The following guidelines are recommended by the American Medical Association for people with gout. Your diet should be:    High in fiber, whole grains, fruits, and vegetables.    Low in protein (15% of calories should come from protein. Choose lean sources, such as soy, lean meats, and poultry).    Low in fat (no more than 30% of calories should come from fat, with only 10% coming from animal, or saturated, fat).   Date Last Reviewed: 11/1/2017 2000-2018 [employer]. [address] [place] [address], [place] [address]. All rights reserved. This information is not intended as a substitute for professional medical care. Always follow your healthcare professional's  instructions.      [facility]    If you have any questions regarding to your visit please contact your care team:       Team Purple:   Clinic Hours Telephone Number   Dr. Yue Purcell   7am-7pm  Monday - Thursday   7am-5pm  Fridays  [phone]  (Appointment scheduling available 24/7)   Urgent Care - [place] [facility] - 11am-9pm Monday-Friday Saturday-Sunday- 9am-5pm   [place] - 5pm-9pm Monday-Friday Saturday-Sunday- 9am-5pm  [phone] - [place]  [phone] - [place]       What options do I have for a visit other than an office visit? We offer electronic visits (e-visits) and telephone visits, when medically appropriate.  Please check with your medical insurance to see if these types of visits are covered, as you will be responsible for any charges that are not paid by your insurance.      You can use [employer] (secure electronic communication) to access to your chart, send your primary care provider a message, or make an appointment. Ask a team member how to get started.     For a price quote for your services, please call our Consumer Price Line at [phone] or our Imaging Cost estimation line at [phone] (for imaging tests).              Follow-ups after your visit        Additional Services     ORTHO  REFERRAL       [facility] is referring you to the Orthopedic  Services at [facility].       The  Representative will assist you in the coordination of your Orthopedic and Musculoskeletal Care as prescribed by your physician.    The  Representative will call you within 1 business day to help schedule your appointment, or you may contact the  Representative at:    All areas ~ [phone]     Type of Referral : Podiatry / Foot & Ankle Surgery       Timeframe requested: 1 - 2 days    Coverage of these services is subject to the terms  and limitations of your health insurance plan.  Please call member services at your health plan with any benefit or coverage questions.      If X-rays, CT or MRI's have been performed, please contact the facility where they were done to arrange for , prior to your scheduled appointment.  Please bring this referral request to your appointment and present it to your specialist.                  Who to contact     If you have questions or need follow up information about today's clinic visit or your schedule please contact [facility] directly at [phone].  Normal or non-critical lab and imaging results will be communicated to you by zkipsterhart, letter or phone within 4 business days after the clinic has received the results. If you do not hear from us within 7 days, please contact the clinic through [employer] or phone. If you have a critical or abnormal lab result, we will notify you by phone as soon as possible.  Submit refill requests through [employer] or call your pharmacy and they will forward the refill request to us. Please allow 3 business days for your refill to be completed.          Additional Information About Your Visit        zkipsterharRingCredible Information     [employer] gives you secure access to your electronic health record. If you see a primary care provider, you can also send messages to your care team and make appointments. If you have questions, please call your primary care clinic.  If you do not have a primary care provider, please call [phone] and they will assist you.        Care EveryWhere ID     This is your Care EveryWhere ID. This could be used by other organizations to access your [place] medical records  [id number]        Your Vitals Were     Pulse Temperature Respirations Pulse Oximetry BMI (Body Mass Index)       88 97.2  F (36.2  C) (Oral) 20 97% 31.9 kg/m2        Blood Pressure from Last 3 Encounters:   11/19/18 116/76   10/04/18 131/82   09/17/18 118/79    Weight from Last  3 Encounters:   11/19/18 235 lb 3.2 oz (106.7 kg)   10/04/18 232 lb (105.2 kg)   09/17/18 227 lb (103 kg)              We Performed the Following     ORTHO  REFERRAL     Uric acid          Today's Medication Changes          These changes are accurate as of 11/19/18  4:58 PM.  If you have any questions, ask your nurse or doctor.               Start taking these medicines.        Dose/Directions    naproxen 375 MG tablet   Commonly known as:  NAPROSYN   Used for:  Acute gout of left foot, unspecified cause   Started by:  Toby Marquez MD        Dose:  375 mg   Take 1 tablet (375 mg) by mouth 2 times daily (with meals)   Quantity:  60 tablet   Refills:  1       predniSONE 20 MG tablet   Commonly known as:  DELTASONE   Used for:  Acute gout of left foot, unspecified cause   Started by:  Toby Marquez MD        Take 3 tabs (60 mg) by mouth daily x 3 days, 2 tabs (40 mg) daily x 3 days, 1 tab (20 mg) daily x 3 days, then 1/2 tab (10 mg) x 3 days.   Quantity:  20 tablet   Refills:  0            Where to get your medicines      These medications were sent to [facility] - [place], [place] - [address]  [address], [facility] [address]     Phone:  [phone]     colchicine 0.6 MG tablet    naproxen 375 MG tablet    predniSONE 20 MG tablet                Primary Care Provider Office Phone # Fax #    Angus Astorga [phone] [address] [facility] [address]        Equal Access to Services     [facility]: Hadii aad ku hadasho Soomaali, waaxda luqadaha, qaybta kaalmada adeegyada, anthony idiin haylamont armas . So [facility] [phone].    ATENCIÓN: Si habla español, tiene a wright disposición servicios gratuitos de asistencia lingüística. Llame al [phone].    We comply with applicable federal civil rights laws and [place] laws. We do not discriminate on the basis of race, color, national  origin, age, disability, sex, sexual orientation, or gender identity.            Thank you!     Thank you for choosing [facility]  for your care. Our goal is always to provide you with excellent care. Hearing back from our patients is one way we can continue to improve our services. Please take a few minutes to complete the written survey that you may receive in the mail after your visit with us. Thank you!             Your Updated Medication List - Protect others around you: Learn how to safely use, store and throw away your medicines at www.disposemymeds.org.          This list is accurate as of 11/19/18  4:58 PM.  Always use your most recent med list.                   Brand Name Dispense Instructions for use Diagnosis    * allopurinol 100 MG tablet    ZYLOPRIM    30 tablet    Take 1 tablet (100 mg) by mouth daily    Gout of foot, unspecified cause, unspecified chronicity, unspecified laterality       * allopurinol 100 MG tablet    ZYLOPRIM    60 tablet    Take 2 tablets (200 mg) by mouth daily    Gout of foot, unspecified cause, unspecified chronicity, unspecified laterality       colchicine 0.6 MG tablet    COLCRYS    40 tablet    One daily or twice daily if needed for gout flare up.    Acute gouty arthritis       naproxen 375 MG tablet    NAPROSYN    60 tablet    Take 1 tablet (375 mg) by mouth 2 times daily (with meals)    Acute gout of left foot, unspecified cause       predniSONE 20 MG tablet    DELTASONE    20 tablet    Take 3 tabs (60 mg) by mouth daily x 3 days, 2 tabs (40 mg) daily x 3 days, 1 tab (20 mg) daily x 3 days, then 1/2 tab (10 mg) x 3 days.    Acute gout of left foot, unspecified cause       * Notice:  This list has 2 medication(s) that are the same as other medications prescribed for you. Read the directions carefully, and ask your doctor or other care provider to review them with you.

## 2018-11-20 LAB — URATE SERPL-MCNC: 7.8 MG/DL (ref 3.5–7.2)

## 2018-12-31 ENCOUNTER — TELEPHONE (OUTPATIENT)
Dept: FAMILY MEDICINE | Facility: CLINIC | Age: 43
End: 2018-12-31

## 2018-12-31 ENCOUNTER — ANCILLARY PROCEDURE (OUTPATIENT)
Dept: GENERAL RADIOLOGY | Facility: CLINIC | Age: 43
End: 2018-12-31
Attending: PHYSICIAN ASSISTANT
Payer: COMMERCIAL

## 2018-12-31 ENCOUNTER — OFFICE VISIT (OUTPATIENT)
Dept: FAMILY MEDICINE | Facility: CLINIC | Age: 43
End: 2018-12-31
Payer: COMMERCIAL

## 2018-12-31 VITALS
TEMPERATURE: 98.7 F | SYSTOLIC BLOOD PRESSURE: 145 MMHG | WEIGHT: 239 LBS | DIASTOLIC BLOOD PRESSURE: 90 MMHG | BODY MASS INDEX: 33.46 KG/M2 | RESPIRATION RATE: 14 BRPM | HEIGHT: 71 IN | HEART RATE: 86 BPM | OXYGEN SATURATION: 97 %

## 2018-12-31 DIAGNOSIS — J22 LOWER RESPIRATORY INFECTION: Primary | ICD-10-CM

## 2018-12-31 DIAGNOSIS — M10.9 ACUTE GOUTY ARTHRITIS: ICD-10-CM

## 2018-12-31 PROCEDURE — 99214 OFFICE O/P EST MOD 30 MIN: CPT | Performed by: PHYSICIAN ASSISTANT

## 2018-12-31 PROCEDURE — 71101 X-RAY EXAM UNILAT RIBS/CHEST: CPT | Mod: RT

## 2018-12-31 RX ORDER — COLCHICINE 0.6 MG/1
TABLET ORAL
Qty: 30 TABLET | Refills: 0 | Status: CANCELLED | OUTPATIENT
Start: 2018-12-31

## 2018-12-31 RX ORDER — COLCHICINE 0.6 MG/1
TABLET ORAL
Qty: 30 TABLET | Refills: 0 | Status: SHIPPED | OUTPATIENT
Start: 2018-12-31

## 2018-12-31 RX ORDER — AZITHROMYCIN 250 MG/1
TABLET, FILM COATED ORAL
Qty: 6 TABLET | Refills: 0 | Status: SHIPPED | OUTPATIENT
Start: 2018-12-31

## 2018-12-31 RX ORDER — ALLOPURINOL 100 MG/1
200 TABLET ORAL DAILY
Qty: 60 TABLET | Refills: 5 | Status: CANCELLED | OUTPATIENT
Start: 2018-12-31

## 2018-12-31 RX ORDER — ALLOPURINOL 100 MG/1
200 TABLET ORAL DAILY
Qty: 60 TABLET | Refills: 5 | Status: SHIPPED | OUTPATIENT
Start: 2018-12-31

## 2018-12-31 RX ORDER — ALBUTEROL SULFATE 90 UG/1
2 AEROSOL, METERED RESPIRATORY (INHALATION) EVERY 4 HOURS PRN
Qty: 1 INHALER | Refills: 0 | Status: SHIPPED | OUTPATIENT
Start: 2018-12-31

## 2018-12-31 RX ORDER — BENZONATATE 200 MG/1
200 CAPSULE ORAL 3 TIMES DAILY PRN
Qty: 30 CAPSULE | Refills: 0 | Status: SHIPPED | OUTPATIENT
Start: 2018-12-31 | End: 2019-01-10

## 2018-12-31 ASSESSMENT — ENCOUNTER SYMPTOMS
HEMOPTYSIS: 0
WEIGHT LOSS: 0
COUGH: 1
GASTROINTESTINAL NEGATIVE: 1
SPUTUM PRODUCTION: 1
SHORTNESS OF BREATH: 1
WHEEZING: 1
EYE PAIN: 0
DIAPHORESIS: 0
FEVER: 0
CONSTITUTIONAL NEGATIVE: 1
PALPITATIONS: 0
CARDIOVASCULAR NEGATIVE: 1

## 2018-12-31 ASSESSMENT — MIFFLIN-ST. JEOR: SCORE: 2001.23

## 2018-12-31 NOTE — LETTER
[facility]  [address] [facility] [address]  [phone]    2018    Re: Asher FUCHS Christian  [address]   [place] [id number] [phone] (home)     : 1975      To Whom It May Concern:      Asher Christian was seen in clinic today and I would recommend no heavy lifting, pushing, or pulling greater than 5lbs for 2weeks.  Please feel free to contact me via phone if you have any questions or concerns.        Sincerely,      Anastasia See SANAM Lyons

## 2018-12-31 NOTE — TELEPHONE ENCOUNTER
Informed patient of abnormal CXR result below:  1. New thickening along the right lateral pleural surface in the mid  chest with nondisplaced fracture of lateral right fifth rib.  2. No evidence for pneumothorax or pleural effusion.  3. Lungs clear, heart and pulmonary vessels within normal limits.    He was given zithromax and tessalon perles for cough.  RICE.  Tylenol/ibuprofen as needed for pain.  If he needs anything stronger than this, will need to be seen again.  Will also give patient lifting restrictions as well.  F/u with PCP regarding pleural thickening.    Anastasia See SANAM Lyons
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
## 2018-12-31 NOTE — LETTER
[facility]  [address] [facility] [address]  [phone]    2018    Re: Asher FUCHS Christian  [address]   [place] [id number] [phone] (home)     : 1975      To Whom It May Concern:      Asher Christian was seen in clinic today and I would recommend no heavy lifting, pushing, or pulling greater than 5lbs for 2weeks.  Please feel free to contact me via phone if you have any questions or concerns.        Sincerely,      Anastasia See SANAM Lyons

## 2018-12-31 NOTE — TELEPHONE ENCOUNTER
Colcrys is not covered. Is another product possible?        Lizzeth Suarezbie    Pharmacy Technician  [facility]

## 2018-12-31 NOTE — TELEPHONE ENCOUNTER
Provider:  Colchicine was refilled at visit today.  How would you like to proceed? Thank you.  Melinda Henson R.N.

## 2019-01-02 ENCOUNTER — TELEPHONE (OUTPATIENT)
Dept: FAMILY MEDICINE | Facility: CLINIC | Age: 44
End: 2019-01-02

## 2019-01-02 NOTE — TELEPHONE ENCOUNTER
Called and spoke to Inez in the pharmacy. They will send to the PA team to see if it can be approved.Karis Parks MA/TC

## 2019-01-02 NOTE — TELEPHONE ENCOUNTER
Need prior auth for:    Colcrys 0.6mg  #30-10days supply    [phone]    ID#[id number]    Thank You  Inez Ronquillo, [facility]  [phone]

## 2019-01-04 NOTE — TELEPHONE ENCOUNTER
Central Prior Authorization Team   Phone: [phone]      PA NOT NEEDED    Medication: colcrys 0.6mg-PA NOT NEEDED  Insurance Company: [employer] - Phone [phone] Fax [phone]  Pharmacy Filling the Rx: [facility] [address], [address]  Filling Pharmacy Phone: [phone]  Filling Pharmacy Fax:    Start Date: 1/4/2019    Pharmacy called back and was able to get the medication to go through [employer] insurance.

## 2019-01-04 NOTE — TELEPHONE ENCOUNTER
Tried both patient's insurances that are on file in [facility] and get a response of Drug is covered by current benefit plan.  Called and left a message for the pharmacy as to what rejection they are getting.

## 2019-03-18 ENCOUNTER — TELEPHONE (OUTPATIENT)
Dept: BEHAVIORAL HEALTH | Facility: CLINIC | Age: 44
End: 2019-03-18

## 2019-03-18 NOTE — TELEPHONE ENCOUNTER
[facility]  No Data Recorded      Social Work Care Navigator Note      Patient: Asher Gonzales  Date: March 18, 2019  Preferred Name: Asher    Previous PHQ-9:   PHQ-9 SCORE 10/9/2012 3/24/2017 4/5/2017   PHQ-9 Total Score 0 - -   PHQ-9 Total Score - 13 5     Previous FRANKLIN-7:   FRANKLIN-7 SCORE 3/24/2017 4/5/2017   Total Score 10 5     MAVERICK LEVEL:  MAVERICK Score (Last Two) 3/24/2017 4/5/2017   MAVERICK Raw Score 36 30   Activation Score 75.5 56   MAVERICK Level 4 3       Preferred Contact:  No Data Recorded    Type of Contact Today: Phone call (not reached/unavailable)      Data: (subjective / Objective):  Attempted to reach patient, but was unsuccessful.  Plan to attempt again.  Mitesh Moncada

## 2019-04-24 ENCOUNTER — TELEPHONE (OUTPATIENT)
Dept: FAMILY MEDICINE | Facility: CLINIC | Age: 44
End: 2019-04-24

## 2019-04-24 NOTE — TELEPHONE ENCOUNTER
[facility]  No data recorded      Social Work Care Navigator Note      Patient: Asher Gonzales  Date: April 24, 2019  Preferred Name: Asher    Previous PHQ-9:   PHQ-9 SCORE 10/9/2012 3/24/2017 4/5/2017   PHQ-9 Total Score 0 - -   PHQ-9 Total Score - 13 5     Previous FRANKLIN-7:   FRANKLIN-7 SCORE 3/24/2017 4/5/2017   Total Score 10 5     MAVERICK LEVEL:  MAVERICK Score (Last Two) 3/24/2017 4/5/2017   MAVERICK Raw Score 36 30   Activation Score 75.5 56   MAVERICK Level 4 3       Preferred Contact:  No data recorded    Type of Contact Today: Phone call (patient / identified key support person reached)      Data: (subjective / Objective):  Attempted to reach patient for monthly [facility] follow up, but was unsuccessful.  Plan to attempt again.    ADD: Patient contacted  back. He had no questions or concerns at this time and feels that things have been going well. He will contact  if any needs do arise.    SUSANNE Schuster  [facility]   [facility]  Ph: [phone]

## 2019-05-31 ENCOUNTER — TELEPHONE (OUTPATIENT)
Dept: FAMILY MEDICINE | Facility: CLINIC | Age: 44
End: 2019-05-31

## 2019-05-31 NOTE — TELEPHONE ENCOUNTER
Behavioral [facility] Home Services  [facility] Clinic: [facility] Work Care Navigator Note      Patient: Asher Gonzales  Date: May 31, 2019  Preferred Name: Asher    Previous PHQ-9:   PHQ-9 SCORE 10/9/2012 3/24/2017 4/5/2017   PHQ-9 Total Score 0 - -   PHQ-9 Total Score - 13 5     Previous FRANKLIN-7:   FRANKLIN-7 SCORE 3/24/2017 4/5/2017   Total Score 10 5     MAVERICK LEVEL:  MAVERICK Score (Last Two) 3/24/2017 4/5/2017   MAVERICK Raw Score 36 30   Activation Score 75.5 56   MAVERICK Level 4 3       Preferred Contact:  Need for : No  Preferred Contact: Cell      Type of Contact Today: Phone call (not reached/unavailable)      Data: (subjective / Objective):  Attempted to reach patient, but was unsuccessful.  Plan to attempt again.  Mitesh Moncada

## 2019-06-13 ENCOUNTER — TELEPHONE (OUTPATIENT)
Dept: FAMILY MEDICINE | Facility: CLINIC | Age: 44
End: 2019-06-13

## 2019-06-13 NOTE — TELEPHONE ENCOUNTER
Behavioral Health Home Services  [facility] Clinic: [facility] Work Care Navigator Note      Patient: Asher Gonzales  Date: June 13, 2019  Preferred Name: Asher    Previous PHQ-9:   PHQ-9 SCORE 10/9/2012 3/24/2017 4/5/2017   PHQ-9 Total Score 0 - -   PHQ-9 Total Score - 13 5     Previous FRANKLIN-7:   FRANKLIN-7 SCORE 3/24/2017 4/5/2017   Total Score 10 5     MAVERICK LEVEL:  MAVERICK Score (Last Two) 3/24/2017 4/5/2017   MAVERICK Raw Score 36 30   Activation Score 75.5 56   MAVERICK Level 4 3       Preferred Contact:  Need for : No  Preferred Contact: Cell      Type of Contact Today: Phone call (not reached/unavailable)      Data: (subjective / Objective):  Attempted to reach patient, but was unsuccessful.  Plan to attempt again.  Mitesh Moncada

## 2019-06-27 ENCOUNTER — TELEPHONE (OUTPATIENT)
Dept: FAMILY MEDICINE | Facility: CLINIC | Age: 44
End: 2019-06-27

## 2019-06-27 NOTE — TELEPHONE ENCOUNTER
Behavioral Health Home Services  [facility] Clinic: [facility] Work Care Navigator Note      Patient: Asher Gonzales  Date: June 27, 2019  Preferred Name: Asher    Previous PHQ-9:   PHQ-9 SCORE 10/9/2012 3/24/2017 4/5/2017   PHQ-9 Total Score 0 - -   PHQ-9 Total Score - 13 5     Previous FRANKLIN-7:   FRANKLIN-7 SCORE 3/24/2017 4/5/2017   Total Score 10 5     MAVERICK LEVEL:  MAVERICK Score (Last Two) 3/24/2017 4/5/2017   MAVERICK Raw Score 36 30   Activation Score 75.5 56   MAVERICK Level 4 3       Preferred Contact:  Need for : No  Preferred Contact: Cell      Type of Contact Today: Phone call (not reached/unavailable)      Data: (subjective / Objective):  Attempted to reach patient, but was unsuccessful.  Plan to attempt again.  Mitesh Moncada

## 2019-07-17 ENCOUNTER — TELEPHONE (OUTPATIENT)
Dept: FAMILY MEDICINE | Facility: CLINIC | Age: 44
End: 2019-07-17

## 2019-07-17 NOTE — TELEPHONE ENCOUNTER
Behavioral Health Home Services  [facility] Clinic: [place]        Social Work Care Navigator Note      Patient: Asher Gonzales  Date: July 17, 2019  Preferred Name: Asher    Previous PHQ-9:   PHQ-9 SCORE 10/9/2012 3/24/2017 4/5/2017   PHQ-9 Total Score 0 - -   PHQ-9 Total Score - 13 5     Previous FRANKLIN-7:   FRANKLIN-7 SCORE 3/24/2017 4/5/2017   Total Score 10 5     MAVERICK LEVEL:  MAVERICK Score (Last Two) 3/24/2017 4/5/2017   MAVERICK Raw Score 36 30   Activation Score 75.5 56   MAVERICK Level 4 3       Preferred Contact:  Need for : No  Preferred Contact: Cell      Type of Contact Today: Phone call (patient / identified key support person reached)      Data: (subjective / Objective):  Recent ED/IP Admission or Discharge?   None    Patient Goals:  Goal Areas: Health;Mental Health;Employment / Volunteer;Financial and Social Service Benefits  Patient stated goals: Health: Goal is to follow up with neurology and their medical recommendations. Mental Health: Goal is to continue with therapy appointments, follow their recommendations and interventions provided. Pt also lists goal as wanting to get some medication for depression & anxiety. 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 Financial: Goal is to potentially receieve some benefits from the state.         [facility] Core Service Provided:  Health and Wellness Promotion    Current Stressors / Issues / Care Plan Objective Addressed Today:   contacted patient. He reports that everything is going well. He has no questions or concerns at this time.     Intervention:  Motivational Interviewing: Open-ended questions   Target Behavior(s): Explored current social supports and reinforced opportunities to increase engagement    Assessment: (Progress on Goals / Homework):   reviewed health action plan goals. See Objectives for more details. Progress: Needs improvement. Next Steps:   will continue to work with patient to support patient in achieving their goals.    Plan: (Homework, other):  Patient was encouraged to continue to seek condition-related information and education.      Scheduled a Phone follow up appointment with NABIL ROMERO in 4 weeks     Patient has set self-identified goals and will monitor progress until the next appointment on: KALI Moncada, Social Work Care Coordinator

## 2019-08-19 ENCOUNTER — TELEPHONE (OUTPATIENT)
Dept: FAMILY MEDICINE | Facility: CLINIC | Age: 44
End: 2019-08-19

## 2019-08-19 NOTE — TELEPHONE ENCOUNTER
Behavioral Health Home Services  [facility] Clinic: [place]        Social Work Care Navigator Note      Patient: Asher Gonzales  Date: August 19, 2019  Preferred Name: Asher    Previous PHQ-9:   PHQ-9 SCORE 10/9/2012 3/24/2017 4/5/2017   PHQ-9 Total Score 0 - -   PHQ-9 Total Score - 13 5     Previous FRANKLIN-7:   FRANKLIN-7 SCORE 3/24/2017 4/5/2017   Total Score 10 5     MAVERICK LEVEL:  MAVERICK Score (Last Two) 3/24/2017 4/5/2017   MAVERICK Raw Score 36 30   Activation Score 75.5 56   MAVERICK Level 4 3       Preferred Contact:  Need for : No  Preferred Contact: Cell      Type of Contact Today: Phone call (not reached/unavailable)      Data: (subjective / Objective):  Attempted to reach patient, but was unsuccessful.  Plan to attempt again.       received a call from patient. He stated that he needs [facility]'s assistance this month, as he was notified he no longer has MA through [facility]. He reports that he does not recall getting any notification as to why it would be cancelled. Patient did state that he worked full time last year until about January of this year, so he questions if his income was too high to still be qualified for this year. He stated that he was fired from his job at [employer] in January and has only been making about $300/week at a part time job since. [facility] offered to schedule an appt so things can be reviewed and the county can be called. Patient is agreeable. Appt scheduled for Wednesday, 8/21 at 2pm    SUSANNE Schuster  [facility]   [facility]  Ph: [phone]

## 2019-08-21 ENCOUNTER — OFFICE VISIT (OUTPATIENT)
Dept: BEHAVIORAL HEALTH | Facility: CLINIC | Age: 44
End: 2019-08-21

## 2019-08-21 DIAGNOSIS — R69 DIAGNOSIS DEFERRED: Primary | ICD-10-CM

## 2019-08-22 NOTE — PROGRESS NOTES
Behavioral Health Home Services  [facility] Clinic: [place]        Social Work Care Navigator Note      Patient: Asher Gonzales  Date: August 22, 2019  Preferred Name: Asher    Previous PHQ-9:   PHQ-9 SCORE 10/9/2012 3/24/2017 4/5/2017   PHQ-9 Total Score 0 - -   PHQ-9 Total Score - 13 5     Previous FRANKLIN-7:   FRANKLIN-7 SCORE 3/24/2017 4/5/2017   Total Score 10 5     MAVERICK LEVEL:  MAVERICK Score (Last Two) 3/24/2017 4/5/2017   MAVERICK Raw Score 36 30   Activation Score 75.5 56   MAVERICK Level 4 3       Preferred Contact:  Need for : No  Preferred Contact: Cell      Type of Contact Today: Face to Face in Clinic      Data: (subjective / Objective):  Recent ED/IP Admission or Discharge?   None    Patient Goals:  Goal Areas: Health;Mental Health;Employment / Volunteer;Financial and Social Service Benefits  Patient stated goals: Health: Goal is to follow up with neurology and their medical recommendations. Mental Health: Goal is to continue with therapy appointments, follow their recommendations and interventions provided. Pt also lists goal as wanting to get some medication for depression & anxiety. 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 Financial: Goal is to potentially receieve some benefits from the state.         [facility] Core Service Provided:  Health and Wellness Promotion    Current Stressors / Issues / Care Plan Objective Addressed Today:   met with patient in clinic to review his ongoing concerns with his medical insurance. [facility] discussed next steps with patient and how to get his insurance re-established. Patient feels that his insurance has ended d/t not renewing it. CC asked if patient wanted to start filling out an application online. Patient stated that he doesn't have a computer, so it would just be easier for him to go right to the [facility] in [place]. CC asked if she could follow up  with him next week to see how it went. Patient confirmed he would be ok with that. Patient also signed consents for [facility].     [facility] faxed consents to [employer] to be scanned in to patient's chart.    Intervention:  Motivational Interviewing: Supported Autonomy, Collaboration, Evocation and Open-ended questions   Target Behavior(s): Explored current social supports and reinforced opportunities to increase engagement    Assessment: (Progress on Goals / Homework):   reviewed health action plan goals. See Objectives for more details. Progress: Needs improvement. Next Steps:  will continue to work with patient to support patient in achieving their goals.    Plan: (Homework, other):  Patient was encouraged to continue to seek condition-related information and education.      Scheduled a Phone follow up appointment with [facility] in 1 week     Patient has set self-identified goals and will monitor progress until the next appointment on: BYRON Garcia, Social Work Care Coordinator

## 2019-09-20 ENCOUNTER — TELEPHONE (OUTPATIENT)
Dept: FAMILY MEDICINE | Facility: CLINIC | Age: 44
End: 2019-09-20

## 2019-09-20 NOTE — TELEPHONE ENCOUNTER
Behavioral Health Home Services  [facility] Clinic: [facility] Work Care Navigator Note      Patient: Asher Gonzales  Date: September 20, 2019  Preferred Name: Asher    Previous PHQ-9:   PHQ-9 SCORE 10/9/2012 3/24/2017 4/5/2017   PHQ-9 Total Score 0 - -   PHQ-9 Total Score - 13 5     Previous FRANKLIN-7:   FRANKLIN-7 SCORE 3/24/2017 4/5/2017   Total Score 10 5     MAVERICK LEVEL:  MAVERICK Score (Last Two) 3/24/2017 4/5/2017   MAVERICK Raw Score 36 30   Activation Score 75.5 56   MAVERICK Level 4 3       Preferred Contact:  Need for : No  Preferred Contact: Cell      Type of Contact Today: Phone call (not reached/unavailable)      Data: (subjective / Objective):  Attempted to reach patient, but was unsuccessful.  Plan to attempt again.  ILIANA SchusterW

## 2019-10-23 ENCOUNTER — TELEPHONE (OUTPATIENT)
Dept: BEHAVIORAL HEALTH | Facility: CLINIC | Age: 44
End: 2019-10-23

## 2019-10-23 NOTE — TELEPHONE ENCOUNTER
Behavioral Health Home Services  [facility] Clinic: [facility] Work Care Navigator Note      Patient: Asher Gonzales  Date: October 23, 2019  Preferred Name: Asher    Previous PHQ-9:   PHQ-9 SCORE 10/9/2012 3/24/2017 4/5/2017   PHQ-9 Total Score 0 - -   PHQ-9 Total Score - 13 5     Previous FRANKILN-7:   FRANKLIN-7 SCORE 3/24/2017 4/5/2017   Total Score 10 5     MAVERICK LEVEL:  MAVERICK Score (Last Two) 3/24/2017 4/5/2017   MAVERICK Raw Score 36 30   Activation Score 75.5 56   MAVERICK Level 4 3       Preferred Contact:  Need for : No  Preferred Contact: Cell      Type of Contact Today: Phone call (not reached/unavailable)      Data: (subjective / Objective):  Attempted to reach patient, but was unsuccessful.  Plan to attempt again.  ILIANA SchusterW

## 2019-11-25 ENCOUNTER — TELEPHONE (OUTPATIENT)
Dept: BEHAVIORAL HEALTH | Facility: CLINIC | Age: 44
End: 2019-11-25

## 2019-11-25 NOTE — TELEPHONE ENCOUNTER
Behavioral Health Home Services  [facility] Clinic: [place]        Social Work Care Navigator Note      Patient: Asher Gonzales  Date: November 25, 2019  Preferred Name: Asher    Previous PHQ-9:   PHQ-9 SCORE 10/9/2012 3/24/2017 4/5/2017   PHQ-9 Total Score 0 - -   PHQ-9 Total Score - 13 5     Previous FRANKLIN-7:   FRANKLIN-7 SCORE 3/24/2017 4/5/2017   Total Score 10 5     MAVERICK LEVEL:  MAVERICK Score (Last Two) 3/24/2017 4/5/2017   MAVERICK Raw Score 36 30   Activation Score 75.5 56   MAVERICK Level 4 3       Preferred Contact:  Need for : No  Preferred Contact: Cell      Type of Contact Today: Phone call (not reached/unavailable)      Data: (subjective / Objective):  Attempted to reach patient, but was unsuccessful.  [facility] has been unable to reach patient for several months. D/t this and patient's ineligible insurance, [facility] discharge patient from [facility].     [facility] sent patient a discharge letter. Patient will no longer receive [facility] services.      SUSANNE Schuster  [facility]   [facility]  Ph: [phone]

## 2019-11-25 NOTE — LETTER
[facility]  [address] [place] [address]  Phone: [phone]    11/25/19    Asher Gonzales  [address]   [place] [address]      Dear Asher,    My name is Mitesh and I'm a Social Work Care Coordinator for [facility] at [facility]. You were enrolled in this program on 3/24/2017. I've been unable to reach you for several months and due to this, I will be closing you from the [facility] program effective 11/25/2019.This does not change your ability to continue receiving care from your PCP/Behavioral Health Clinician here at our Primary Care Clinic.    If you find that you are still interested in [facility] services, or would like to be enrolled again in the future, you can call me to schedule a meeting. Please let me know if you have any questions regarding this notification. You can reach me on my direct line, which is [phone].       Thank you,        Mitesh Moncada, [facility]  [facility]   [phone]

## 2019-12-08 ENCOUNTER — HEALTH MAINTENANCE LETTER (OUTPATIENT)
Age: 44
End: 2019-12-08

## 2021-01-10 ENCOUNTER — HEALTH MAINTENANCE LETTER (OUTPATIENT)
Age: 46
End: 2021-01-10

## 2021-10-23 ENCOUNTER — HEALTH MAINTENANCE LETTER (OUTPATIENT)
Age: 46
End: 2021-10-23

## 2022-01-26 ENCOUNTER — OFFICE VISIT (OUTPATIENT)
Dept: ORTHOPEDICS | Facility: CLINIC | Age: 47
End: 2022-01-26
Payer: COMMERCIAL

## 2022-01-26 ENCOUNTER — ANCILLARY PROCEDURE (OUTPATIENT)
Dept: GENERAL RADIOLOGY | Facility: CLINIC | Age: 47
End: 2022-01-26
Attending: PEDIATRICS
Payer: COMMERCIAL

## 2022-01-26 VITALS
WEIGHT: 246 LBS | DIASTOLIC BLOOD PRESSURE: 78 MMHG | HEIGHT: 71 IN | BODY MASS INDEX: 34.44 KG/M2 | SYSTOLIC BLOOD PRESSURE: 134 MMHG

## 2022-01-26 DIAGNOSIS — M75.101 ROTATOR CUFF SYNDROME OF RIGHT SHOULDER: ICD-10-CM

## 2022-01-26 DIAGNOSIS — Z87.81 HISTORY OF FRACTURE OF RIGHT SHOULDER: ICD-10-CM

## 2022-01-26 DIAGNOSIS — M25.511 CHRONIC RIGHT SHOULDER PAIN: ICD-10-CM

## 2022-01-26 DIAGNOSIS — G89.29 CHRONIC RIGHT SHOULDER PAIN: Primary | ICD-10-CM

## 2022-01-26 DIAGNOSIS — G89.29 CHRONIC RIGHT SHOULDER PAIN: ICD-10-CM

## 2022-01-26 DIAGNOSIS — M25.511 CHRONIC RIGHT SHOULDER PAIN: Primary | ICD-10-CM

## 2022-01-26 PROCEDURE — 99203 OFFICE O/P NEW LOW 30 MIN: CPT | Mod: 25 | Performed by: PEDIATRICS

## 2022-01-26 PROCEDURE — 73030 X-RAY EXAM OF SHOULDER: CPT | Mod: RT | Performed by: RADIOLOGY

## 2022-01-26 PROCEDURE — 20610 DRAIN/INJ JOINT/BURSA W/O US: CPT | Mod: RT | Performed by: PEDIATRICS

## 2022-01-26 RX ORDER — LIDOCAINE HYDROCHLORIDE 10 MG/ML
2 INJECTION, SOLUTION INFILTRATION; PERINEURAL
Status: SHIPPED | OUTPATIENT
Start: 2022-01-26

## 2022-01-26 RX ORDER — TRIAMCINOLONE ACETONIDE 40 MG/ML
40 INJECTION, SUSPENSION INTRA-ARTICULAR; INTRAMUSCULAR
Status: SHIPPED | OUTPATIENT
Start: 2022-01-26

## 2022-01-26 RX ADMIN — TRIAMCINOLONE ACETONIDE 40 MG: 40 INJECTION, SUSPENSION INTRA-ARTICULAR; INTRAMUSCULAR at 09:48

## 2022-01-26 RX ADMIN — LIDOCAINE HYDROCHLORIDE 2 ML: 10 INJECTION, SOLUTION INFILTRATION; PERINEURAL at 09:48

## 2022-01-26 ASSESSMENT — MIFFLIN-ST. JEOR: SCORE: 2017.98
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
## 2022-01-26 NOTE — PATIENT INSTRUCTIONS
[email or web address]      X-ray from today shows findings consistent with previous injury.  In this instance, favor rotator cuff source, though tear is not favored; more likely impingement.  Discussed options of additional imaging with MRI, physical therapy, and injection.  With insurance expiring at the end of the month, right shoulder subacromial steroid injection done today.  Care coordination referral placed, regarding loss of insurance.  Hold with additional imaging and formal therapy for now.  These may be considerations if persistent issues despite the injection.  Regarding therapy, try to do home exercises from previous physical therapy.  See the link above for reference regarding the exercises.  Follow-up will leave open-ended.    If you have any further questions for your physician or physician s care team you can call [phone] and use option 3 to leave a voice message. Calls received during business hours will be returned same day.          Injection Discharge Instructions      You may shower, however avoid swimming, tub baths or hot tubs for 24 hours following your procedure    You may have a mild to moderate increase in pain for several days following the injection.    It may take up to 14 days for the steroid medication to start working although you may feel the effect as early as a few days after the procedure.    You may use ice packs for 10-15 minutes, 3 to 4 times a day at the injection site for comfort    You may use anti-inflammatory medications (such as Ibuprofen or Aleve or Advil) if you are able to take safely, or acetaminophen (Tylenol) for pain control if necessary    If you were fasting, you may resume your normal diet and medications after the procedure    If you have diabetes, check your blood sugar more frequently than usual as your blood sugar may be higher than normal for 10-14 days following a steroid injection. Contact your doctor who manages your  diabetes if your blood sugar is higher than usual      If you experience any of the following, call the clinic @ [phone]  - Fever over 100 degree F  - Swelling, bleeding, redness, drainage, warmth at the injection site  - New or worsening pain

## 2022-01-27 ENCOUNTER — PATIENT OUTREACH (OUTPATIENT)
Dept: CARE COORDINATION | Facility: CLINIC | Age: 47
End: 2022-01-27
Payer: COMMERCIAL

## 2022-01-27 NOTE — PROGRESS NOTES
Clinic Care Coordination Contact  [facility]/Voicemail    Clinical Data: Care Coordinator Outreach  Outreach attempted x 1.  Left message on patient's voicemail with call back information and requested return call.  Plan: Care Coordinator will try to reach patient again in 1-2 business days.    Order Specific Question Answer Comment   Reason for Referral: Financial Support     Financial Support: Insurance     Clinical Staff have discussed the Care Coordination Referral with the patient and/or caregiver: Yes     Additional Information: losing insurance at end of January     Notes   Please schedule with [facility]. Thank you!       Isabel Moore  Community Health Worker  [facility] Care Coordination   Office: [phone]

## 2022-01-27 NOTE — LETTER
[facility] CARE COORDINATION    January 28, 2022    Asher Gonzales  [address] [place] [address]      Dear Asher,    I am a clinic community health worker who works with MARIA INES ALFARO MD at [facility]. I wanted to introduce myself and provide you with my contact information for you to be able to call with any questions or concerns. Below is a description of clinic care coordination and how we can further assist you.      The clinic care coordination team is made up of a registered nurse,  and community health worker who understand the health care system. The goal of clinic care coordination is to help you manage your health and improve access to the health care system in the most efficient manner. The team can assist you in meeting your health care goals by providing education, coordinating services, strengthening the communication among your providers and supporting you with any resource needs.    Please feel free to contact me with any questions or concerns. We are focused on providing you with the highest-quality healthcare experience possible and that all starts with you.     Sincerely,       Daniela Childress  Community Health Worker   [facility]  Clinic Care Coordination  [email or web address]  Office: [phone]

## 2022-01-28 NOTE — PROGRESS NOTES
Clinic Care Coordination Contact  [facility]/Voicemail    Clinical Data: Care Coordination Outreach  Outreach attempted x 2.  Left message on patient's voicemail with call back information and requested return call.  Plan: CHW sent care coordination introduction letter on 1/28/22 via [employer]. CHW will do no further outreaches at this time.    Batsheva SMITH  Community Health Worker  [facility] Care Coordination  [facility]  [email or web address]   Office: [phone]

## 2022-02-12 ENCOUNTER — HEALTH MAINTENANCE LETTER (OUTPATIENT)
Age: 47
End: 2022-02-12

## 2022-10-09 ENCOUNTER — HEALTH MAINTENANCE LETTER (OUTPATIENT)
Age: 47
End: 2022-10-09

## 2023-02-18 ENCOUNTER — HEALTH MAINTENANCE LETTER (OUTPATIENT)
Age: 48
End: 2023-02-18

## 2023-03-24 ENCOUNTER — TRANSFERRED RECORDS (OUTPATIENT)
Dept: HEALTH INFORMATION MANAGEMENT | Facility: CLINIC | Age: 48
End: 2023-03-24

## 2023-11-08 NOTE — PROGRESS NOTES
HPI:    Asher is a 42 year old male here to discuss:    Left sided rib pain - he has had this for a couple of month. He had a seizure and a cardiac arrest. He needed chest compressions. He continues to have pain, especially with coughing. No shortness of breath.        RIBS UNILATERAL THREE VIEWS LEFT  5/23/2017 3:53 PM     HISTORY: Pleurodynia     COMPARISON: 12/3/2012         IMPRESSION: Oblique views of the left hemithorax demonstrate lower  lateral rib fractures of the 8th, 9th, and 10th ribs on the left,  there appears to be possibly some callus formation, question acute  versus subacute fractures. No pneumothorax.     CM MCKEON MD    Alcoholism - it is thought that he had w/d seizures. He does not believe this. He continues to drink. He is not ready to talk about treatment etc.    Exam:    /66  Pulse 94  Temp 97.8  F (36.6  C) (Oral)  Wt 201 lb (91.2 kg)  SpO2 96%  BMI 28.03 kg/m2    Gen: Healthy appearing male in no acute distress. Here with girlfriend.  Lungs: Good air movement and otherwise clear.  CV: Heart RRR with no murmurs. No JVD, carotid bruits or leg edema.  MS: localized tenderness left lateral lower ribs without bruising.        Assessment and Plan - Decision Making    1. Rib pain on left side  See below.  - XR Ribs & Chest Left G/E 3 Views; Future      Written instructions given as follows:    Patient Instructions   1. Your x-ray showed at least one fracture - this will be read by the radiologist.    2. Take a deep breath every 15 minutes.    3. Rib pain usually takes a while to heal (may be up to several weeks) - so try to be patient.    4. Apply heat or ice as needed for comfort.    5. Take Tylenol and Ibuprofen as needed.     6. Come back as needed.          Anesthesia Evaluation     Patient summary reviewed   no history of anesthetic complications:   NPO Solid Status: > 6 hours             Airway   Mallampati: II  Dental      Pulmonary    (+) asthma,  (-) sleep apnea  Cardiovascular   Exercise tolerance: good (4-7 METS)    (+) hypertension  (-) pacemaker, past MI, dysrhythmias, cardiac stents    ROS comment: 2020: Normal stress echo with no significant echocardiographic evidence for myocardial ischemia.    Neuro/Psych  (-) seizures, CVA  GI/Hepatic/Renal/Endo    (+) thyroid problem hypothyroidism  (-) diabetes    Musculoskeletal     Abdominal    Substance History      OB/GYN          Other      history of cancer                      Anesthesia Plan    ASA 2     MAC     intravenous induction     Anesthetic plan, risks, benefits, and alternatives have been provided, discussed and informed consent has been obtained with: patient.        CODE STATUS:

## 2024-03-16 ENCOUNTER — HEALTH MAINTENANCE LETTER (OUTPATIENT)
Age: 49
End: 2024-03-16